# Patient Record
Sex: MALE | Race: WHITE | Employment: FULL TIME | URBAN - METROPOLITAN AREA
[De-identification: names, ages, dates, MRNs, and addresses within clinical notes are randomized per-mention and may not be internally consistent; named-entity substitution may affect disease eponyms.]

---

## 2020-10-19 ENCOUNTER — APPOINTMENT (OUTPATIENT)
Dept: CT IMAGING | Age: 65
End: 2020-10-19

## 2020-10-19 ENCOUNTER — HOSPITAL ENCOUNTER (INPATIENT)
Age: 65
LOS: 1 days | Discharge: HOME | DRG: 065 | End: 2020-10-20
Attending: INTERNAL MEDICINE | Admitting: INTERNAL MEDICINE
Payer: MEDICARE

## 2020-10-19 ENCOUNTER — APPOINTMENT (OUTPATIENT)
Dept: CT IMAGING | Age: 65
DRG: 065 | End: 2020-10-19
Attending: INTERNAL MEDICINE

## 2020-10-19 ENCOUNTER — APPOINTMENT (OUTPATIENT)
Dept: GENERAL RADIOLOGY | Age: 65
End: 2020-10-19

## 2020-10-19 ENCOUNTER — APPOINTMENT (OUTPATIENT)
Dept: MRI IMAGING | Age: 65
DRG: 065 | End: 2020-10-19
Attending: INTERNAL MEDICINE

## 2020-10-19 ENCOUNTER — HOSPITAL ENCOUNTER (EMERGENCY)
Age: 65
Discharge: ANOTHER HOSPITAL | End: 2020-10-19
Attending: STUDENT IN AN ORGANIZED HEALTH CARE EDUCATION/TRAINING PROGRAM
Payer: MEDICARE

## 2020-10-19 VITALS
HEART RATE: 66 BPM | TEMPERATURE: 97.8 F | BODY MASS INDEX: 24.27 KG/M2 | DIASTOLIC BLOOD PRESSURE: 98 MMHG | WEIGHT: 189.1 LBS | OXYGEN SATURATION: 96 % | SYSTOLIC BLOOD PRESSURE: 126 MMHG | HEIGHT: 74 IN | RESPIRATION RATE: 15 BRPM

## 2020-10-19 PROBLEM — C34.90 LUNG CANCER (HCC): Status: ACTIVE | Noted: 2020-10-19

## 2020-10-19 PROBLEM — R91.8 LUNG MASS: Status: ACTIVE | Noted: 2020-10-19

## 2020-10-19 PROBLEM — G45.9 TIA (TRANSIENT ISCHEMIC ATTACK): Status: ACTIVE | Noted: 2020-10-19

## 2020-10-19 LAB
A/G RATIO: 1.6 (ref 1.1–2.2)
ALBUMIN SERPL-MCNC: 4.6 G/DL (ref 3.4–5)
ALP BLD-CCNC: 60 U/L (ref 40–129)
ALT SERPL-CCNC: 15 U/L (ref 10–40)
ANION GAP SERPL CALCULATED.3IONS-SCNC: 9 MMOL/L (ref 3–16)
AST SERPL-CCNC: 23 U/L (ref 15–37)
BASOPHILS ABSOLUTE: 0.1 K/UL (ref 0–0.2)
BASOPHILS RELATIVE PERCENT: 0.9 %
BILIRUB SERPL-MCNC: 0.4 MG/DL (ref 0–1)
BUN BLDV-MCNC: 8 MG/DL (ref 7–20)
CALCIUM SERPL-MCNC: 9.8 MG/DL (ref 8.3–10.6)
CHLORIDE BLD-SCNC: 88 MMOL/L (ref 99–110)
CO2: 30 MMOL/L (ref 21–32)
CREAT SERPL-MCNC: 1.1 MG/DL (ref 0.8–1.3)
EKG ATRIAL RATE: 81 BPM
EKG DIAGNOSIS: NORMAL
EKG P AXIS: 75 DEGREES
EKG P-R INTERVAL: 166 MS
EKG Q-T INTERVAL: 396 MS
EKG QRS DURATION: 94 MS
EKG QTC CALCULATION (BAZETT): 460 MS
EKG R AXIS: -18 DEGREES
EKG T AXIS: 71 DEGREES
EKG VENTRICULAR RATE: 81 BPM
EOSINOPHILS ABSOLUTE: 0.1 K/UL (ref 0–0.6)
EOSINOPHILS RELATIVE PERCENT: 1 %
GFR AFRICAN AMERICAN: >60
GFR NON-AFRICAN AMERICAN: >60
GLOBULIN: 2.8 G/DL
GLUCOSE BLD-MCNC: 125 MG/DL (ref 70–99)
GLUCOSE BLD-MCNC: 153 MG/DL (ref 70–99)
HCT VFR BLD CALC: 44.7 % (ref 40.5–52.5)
HEMOGLOBIN: 15.2 G/DL (ref 13.5–17.5)
INR BLD: 1.01 (ref 0.86–1.14)
LYMPHOCYTES ABSOLUTE: 0.6 K/UL (ref 1–5.1)
LYMPHOCYTES RELATIVE PERCENT: 7.3 %
MCH RBC QN AUTO: 31.2 PG (ref 26–34)
MCHC RBC AUTO-ENTMCNC: 34.1 G/DL (ref 31–36)
MCV RBC AUTO: 91.6 FL (ref 80–100)
MONOCYTES ABSOLUTE: 0.7 K/UL (ref 0–1.3)
MONOCYTES RELATIVE PERCENT: 8 %
NEUTROPHILS ABSOLUTE: 6.9 K/UL (ref 1.7–7.7)
NEUTROPHILS RELATIVE PERCENT: 82.8 %
PDW BLD-RTO: 14.4 % (ref 12.4–15.4)
PERFORMED ON: ABNORMAL
PLATELET # BLD: 294 K/UL (ref 135–450)
PMV BLD AUTO: 6.2 FL (ref 5–10.5)
POTASSIUM REFLEX MAGNESIUM: 4 MMOL/L (ref 3.5–5.1)
PRO-BNP: 272 PG/ML (ref 0–124)
PROTHROMBIN TIME: 11.7 SEC (ref 10–13.2)
RBC # BLD: 4.88 M/UL (ref 4.2–5.9)
SODIUM BLD-SCNC: 127 MMOL/L (ref 136–145)
TOTAL PROTEIN: 7.4 G/DL (ref 6.4–8.2)
TROPONIN: <0.01 NG/ML
WBC # BLD: 8.3 K/UL (ref 4–11)

## 2020-10-19 PROCEDURE — 93010 ELECTROCARDIOGRAM REPORT: CPT | Performed by: INTERNAL MEDICINE

## 2020-10-19 PROCEDURE — 2060000000 HC ICU INTERMEDIATE R&B

## 2020-10-19 PROCEDURE — 80053 COMPREHEN METABOLIC PANEL: CPT

## 2020-10-19 PROCEDURE — 70498 CT ANGIOGRAPHY NECK: CPT

## 2020-10-19 PROCEDURE — 70553 MRI BRAIN STEM W/O & W/DYE: CPT

## 2020-10-19 PROCEDURE — 2580000003 HC RX 258: Performed by: INTERNAL MEDICINE

## 2020-10-19 PROCEDURE — 6370000000 HC RX 637 (ALT 250 FOR IP): Performed by: STUDENT IN AN ORGANIZED HEALTH CARE EDUCATION/TRAINING PROGRAM

## 2020-10-19 PROCEDURE — 6360000004 HC RX CONTRAST MEDICATION: Performed by: STUDENT IN AN ORGANIZED HEALTH CARE EDUCATION/TRAINING PROGRAM

## 2020-10-19 PROCEDURE — A9579 GAD-BASE MR CONTRAST NOS,1ML: HCPCS | Performed by: STUDENT IN AN ORGANIZED HEALTH CARE EDUCATION/TRAINING PROGRAM

## 2020-10-19 PROCEDURE — 97161 PT EVAL LOW COMPLEX 20 MIN: CPT

## 2020-10-19 PROCEDURE — 84484 ASSAY OF TROPONIN QUANT: CPT

## 2020-10-19 PROCEDURE — 93005 ELECTROCARDIOGRAM TRACING: CPT | Performed by: STUDENT IN AN ORGANIZED HEALTH CARE EDUCATION/TRAINING PROGRAM

## 2020-10-19 PROCEDURE — 85610 PROTHROMBIN TIME: CPT

## 2020-10-19 PROCEDURE — 99285 EMERGENCY DEPT VISIT HI MDM: CPT

## 2020-10-19 PROCEDURE — 70450 CT HEAD/BRAIN W/O DYE: CPT

## 2020-10-19 PROCEDURE — 83880 ASSAY OF NATRIURETIC PEPTIDE: CPT

## 2020-10-19 PROCEDURE — 92610 EVALUATE SWALLOWING FUNCTION: CPT

## 2020-10-19 PROCEDURE — 97530 THERAPEUTIC ACTIVITIES: CPT

## 2020-10-19 PROCEDURE — 71045 X-RAY EXAM CHEST 1 VIEW: CPT

## 2020-10-19 PROCEDURE — 99223 1ST HOSP IP/OBS HIGH 75: CPT | Performed by: PSYCHIATRY & NEUROLOGY

## 2020-10-19 PROCEDURE — 6370000000 HC RX 637 (ALT 250 FOR IP): Performed by: INTERNAL MEDICINE

## 2020-10-19 PROCEDURE — 2580000003 HC RX 258: Performed by: STUDENT IN AN ORGANIZED HEALTH CARE EDUCATION/TRAINING PROGRAM

## 2020-10-19 PROCEDURE — 71250 CT THORAX DX C-: CPT

## 2020-10-19 PROCEDURE — 85025 COMPLETE CBC W/AUTO DIFF WBC: CPT

## 2020-10-19 RX ORDER — 0.9 % SODIUM CHLORIDE 0.9 %
1000 INTRAVENOUS SOLUTION INTRAVENOUS ONCE
Status: COMPLETED | OUTPATIENT
Start: 2020-10-19 | End: 2020-10-19

## 2020-10-19 RX ORDER — ASPIRIN 81 MG/1
81 TABLET ORAL DAILY
Status: DISCONTINUED | OUTPATIENT
Start: 2020-10-20 | End: 2020-10-20 | Stop reason: HOSPADM

## 2020-10-19 RX ORDER — POLYETHYLENE GLYCOL 3350 17 G/17G
17 POWDER, FOR SOLUTION ORAL DAILY PRN
Status: DISCONTINUED | OUTPATIENT
Start: 2020-10-19 | End: 2020-10-20 | Stop reason: HOSPADM

## 2020-10-19 RX ORDER — ASPIRIN 81 MG/1
324 TABLET, CHEWABLE ORAL ONCE
Status: COMPLETED | OUTPATIENT
Start: 2020-10-19 | End: 2020-10-19

## 2020-10-19 RX ORDER — SODIUM CHLORIDE 0.9 % (FLUSH) 0.9 %
10 SYRINGE (ML) INJECTION PRN
Status: DISCONTINUED | OUTPATIENT
Start: 2020-10-19 | End: 2020-10-20 | Stop reason: HOSPADM

## 2020-10-19 RX ORDER — PROMETHAZINE HYDROCHLORIDE 25 MG/1
12.5 TABLET ORAL EVERY 6 HOURS PRN
Status: DISCONTINUED | OUTPATIENT
Start: 2020-10-19 | End: 2020-10-20 | Stop reason: HOSPADM

## 2020-10-19 RX ORDER — ASPIRIN 300 MG/1
300 SUPPOSITORY RECTAL DAILY
Status: DISCONTINUED | OUTPATIENT
Start: 2020-10-20 | End: 2020-10-20 | Stop reason: HOSPADM

## 2020-10-19 RX ORDER — SODIUM CHLORIDE 0.9 % (FLUSH) 0.9 %
10 SYRINGE (ML) INJECTION EVERY 12 HOURS SCHEDULED
Status: DISCONTINUED | OUTPATIENT
Start: 2020-10-19 | End: 2020-10-20 | Stop reason: HOSPADM

## 2020-10-19 RX ORDER — ONDANSETRON 2 MG/ML
4 INJECTION INTRAMUSCULAR; INTRAVENOUS EVERY 6 HOURS PRN
Status: DISCONTINUED | OUTPATIENT
Start: 2020-10-19 | End: 2020-10-20 | Stop reason: HOSPADM

## 2020-10-19 RX ORDER — ATORVASTATIN CALCIUM 80 MG/1
80 TABLET, FILM COATED ORAL NIGHTLY
Status: DISCONTINUED | OUTPATIENT
Start: 2020-10-19 | End: 2020-10-20 | Stop reason: HOSPADM

## 2020-10-19 RX ADMIN — ASPIRIN 324 MG: 81 TABLET, CHEWABLE ORAL at 03:04

## 2020-10-19 RX ADMIN — IOPAMIDOL 85 ML: 755 INJECTION, SOLUTION INTRAVENOUS at 01:54

## 2020-10-19 RX ADMIN — Medication 10 ML: at 20:19

## 2020-10-19 RX ADMIN — Medication 10 ML: at 12:26

## 2020-10-19 RX ADMIN — SODIUM CHLORIDE 1000 ML: 9 INJECTION, SOLUTION INTRAVENOUS at 02:03

## 2020-10-19 RX ADMIN — GADOTERIDOL 18 ML: 279.3 INJECTION, SOLUTION INTRAVENOUS at 16:40

## 2020-10-19 RX ADMIN — ATORVASTATIN CALCIUM 80 MG: 80 TABLET, FILM COATED ORAL at 20:19

## 2020-10-19 ASSESSMENT — ENCOUNTER SYMPTOMS
SHORTNESS OF BREATH: 0
PHOTOPHOBIA: 0
RHINORRHEA: 0
NAUSEA: 0
VOMITING: 0
SORE THROAT: 0
COUGH: 1
BACK PAIN: 0
ABDOMINAL PAIN: 0

## 2020-10-19 ASSESSMENT — PAIN SCALES - GENERAL
PAINLEVEL_OUTOF10: 0

## 2020-10-19 NOTE — H&P
Hospital Medicine History & Physical      PCP: No primary care provider on file. Date of Admission: 10/19/2020    Date of Service: Pt seen/examined on 10/19/2020  and Admitted to Inpatient with expected LOS greater than two midnights due to medical therapy. Chief Complaint: Right-sided weakness    History Of Present Illness:   77 y.o. male who presented to [facility] with right-sided weakness. PMHx significant for remote history of lung cancer status post surgery and chemotherapy. Who presents with an episode of acute onset of right-sided weakness greater in the upper extremity than lower extremity. Symptoms occurred late at night after he had eaten dinner but before he was asleep. He denies any speech difficulty, facial droop or balance issues. 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 During ED evaluation his symptoms are gradually improving. The stroke team was consulted. Head CT was negative. CTA head and neck was negative for any large vessel occlusion. Ultimately he was deemed not a TPA candidate due to mild and resolving symptoms. He was admitted for further evaluation. At the time my evaluation following morning patient reports his right side is practically back to normal.  He denies any speech disturbance, facial droop, weakness, sensory change. He is ambulating without any significant difficulty. The head and neck CTA incidentally noted a left sided lung mass in the upper lobe. Based on his reported history of right-sided lung cancer with surgical resection with suspect this may be a new lesion. The most recent CT scan available in the electronic record is from 2011 through [facility] which showed right sided scarring but no significant disease on the left. He previously followed with Dr. Angella Hannon through [facility] oncology.       Past Medical History:          Diagnosis Date    Cancer [facility])     lung ca 2001       Past Surgical History:          Procedure Laterality Date    APPENDECTOMY      LUNG CANCER SURGERY         Medications Prior to Admission:      Prior to Admission medications    Medication Sig Start Date End Date Taking? Authorizing Provider   EPINEPHrine (EPIPEN 2-KAMI) 0.3 MG/0.3ML SOAJ injection Inject 0.3 mLs into the muscle once as needed (severe allergic reaction) Inject into lateral thigh muscle. 12/20/17 12/20/17  Megha Contreras MD       Allergies:  Patient has no known allergies. Social History:    TOBACCO:   reports that he has been smoking cigarettes. He has been smoking about 2.00 packs per day. He has never used smokeless tobacco.  ETOH:   reports current alcohol use. E-Cigarettes Vaping or Juuling     Questions Responses    Vaping Use     Start Date     Does device contain nicotine? Quit Date     Vaping Type             Family History:    Reviewed and negative in regards to presenting illness. REVIEW OF SYSTEMS:   Pertinent positives as noted in the HPI. All other systems reviewed and negative. Physical Exam Performed:    BP (!) 145/96   Pulse 69   Temp 98 °F (36.7 °C) (Oral)   Resp 16   SpO2 96%     General appearance: No apparent distress, appears stated age and cooperative. HEENT:  Normal cephalic, atraumatic without obvious deformity. Pupils equal, round, and reactive to light. Extra ocular muscles intact. Conjunctivae/corneas clear. Neck: Supple, no jugular venous distention. Trachea midline with full range of motion. Respiratory:  Normal respiratory effort. Clear to auscultation, bilaterally without Rales/Wheezes/Rhonchi. Cardiovascular: Regular rate and rhythm with normal S1/S2 without murmurs, rubs or gallops. Abdomen: Soft, non-tender, non-distended with normal bowel sounds. Musculoskelatal: No clubbing, cyanosis or edema bilaterally. Full range of motion without deformity. Neurologic:  Neurovascularly intact without any focal sensory/motor deficits.  Cranial nerves: II-XII intact, grossly non-focal.  Psychiatric: Alert and oriented, thought content appropriate, normal insight  Skin: Surgical scar right posterior chest wall. Skin color, texture, turgor normal.  No rashes or lesions. Capillary Refill: Brisk,< 3 seconds   Peripheral Pulses: +2 palpable, equal bilaterally       Labs:     Recent Labs     10/19/20  0133   WBC 8.3   HGB 15.2   HCT 44.7        Recent Labs     10/19/20  0133   *   K 4.0   CL 88*   CO2 30   BUN 8   CREATININE 1.1   CALCIUM 9.8     Recent Labs     10/19/20  0133   AST 23   ALT 15   BILITOT 0.4   ALKPHOS 60     Recent Labs     10/19/20  0133   INR 1.01     Recent Labs     10/19/20  0133   TROPONINI <0.01       Radiology:     CXR: I have reviewed the CXR with the following interpretation: Clear. EKG:  I have reviewed the EKG with the following interpretation: Normal sinus rhythm, no acute ischemic changes. Ct Head Wo Contrast    Result Date: 10/19/2020  EXAMINATION: CT OF THE HEAD WITHOUT CONTRAST  10/19/2020 1:32 am TECHNIQUE: CT of the head was performed without the administration of intravenous contrast. Dose modulation, iterative reconstruction, and/or weight based adjustment of the mA/kV was utilized to reduce the radiation dose to as low as reasonably achievable. COMPARISON: None. HISTORY: ORDERING SYSTEM PROVIDED HISTORY: Right sided weakness TECHNOLOGIST PROVIDED HISTORY: Reason for exam:->Right sided weakness Has a \"code stroke\" or \"stroke alert\" been called? ->Yes Reason for Exam: stroke protocol Acuity: Acute Type of Exam: Initial Additional signs and symptoms: stroke FINDINGS: BRAIN/VENTRICLES: There is no acute intracranial hemorrhage, mass effect or midline shift. No abnormal extra-axial fluid collection. The gray-white differentiation is maintained without evidence of an acute infarct. There is prominence of the ventricles and sulci due to global parenchymal volume loss.  There are nonspecific areas of hypoattenuation within the periventricular and subcortical white matter, which likely represent chronic microvascular ischemic change. Atherosclerosis. ORBITS: The visualized portion of the orbits demonstrate no acute abnormality. SINUSES: Minimal right maxillary sinus mucosal thickening. Fluid in the left greater than right mastoid air cells. SOFT TISSUES/SKULL: No acute abnormality of the visualized skull or soft tissues. No acute intracranial abnormality. Chronic small vessel ischemic disease. Critical results were called by Dr. Bert Martinez to [facility] [place] on 10/19/2020 at 01:56. [facility] Chest Portable    Result Date: 10/19/2020  EXAMINATION: ONE XRAY VIEW OF THE CHEST 10/19/2020 1:53 am COMPARISON: None. HISTORY: ORDERING SYSTEM PROVIDED HISTORY: stroke like symptoms TECHNOLOGIST PROVIDED HISTORY: Reason for exam:->stroke like symptoms Reason for Exam: stroke protocol Acuity: Acute Type of Exam: Unknown Additional signs and symptoms: rt side weakness FINDINGS: No pneumothorax, pleural effusion or consolidative airspace disease. There seems to be some linear scarring at the lung apices, not well delineated. Same dense small nodular opacities also present. Surgical clips overlie the low right neck. Normal heart size and mediastinal contours. No acute osseous abnormality. No acute cardiopulmonary findings. Probably some scarring at the lung apices, along with some punctate calcified granulomas. Cta Head Neck W Contrast    Result Date: 10/19/2020  EXAMINATION: CTA OF THE HEAD AND NECK WITH CONTRAST 10/19/2020 1:33 am: TECHNIQUE: CTA of the head and neck was performed with the administration of intravenous contrast. Multiplanar reformatted images are provided for review. MIP images are provided for review. Stenosis of the internal carotid arteries measured using NASCET criteria.  Dose modulation, iterative reconstruction, and/or weight based adjustment of the mA/kV was utilized to reduce the radiation dose to as low as reasonably achievable. COMPARISON: None. HISTORY: ORDERING SYSTEM PROVIDED HISTORY: right sided deficits TECHNOLOGIST PROVIDED HISTORY: Reason for exam:->right sided deficits Reason for Exam: stroke protocol Acuity: Acute Type of Exam: Unknown Additional signs and symptoms: rt side weakness FINDINGS: CTA NECK: AORTIC ARCH/ARCH VESSELS: Atherosclerosis in the visualized thoracic aorta. No evidence of aneurysm or dissection in the visualized thoracic aorta. Atherosclerosis of the right brachiocephalic and left subclavian arteries without hemodynamically significant stenosis. Focal occlusion/high-grade stenosis in the proximal right subclavian artery with reconstitution of flow just proximal to the origin of the right vertebral artery. CAROTID ARTERIES: Atherosclerosis of the bilateral common carotid arteries without hemodynamically significant stenosis, acute injury or dissection. Bilateral carotid bulb atherosclerotic plaque. No stenosis of the bilateral cervical internal carotid arteries per NASCET criteria. VERTEBRAL ARTERIES: No dissection, arterial injury, or significant stenosis. SOFT TISSUES: The lung apices demonstrate respiratory motion, linear scarring, emphysema and bullous disease. Left upper lobe heterogeneous 4.5 x 2.0 cm masslike opacity with some cavitation may relate to patient history of lung cancer. No cervical or superior mediastinal lymphadenopathy. The larynx and pharynx are unremarkable. No acute abnormality of the salivary and thyroid glands. BONES: No acute osseous abnormality. Multilevel degenerative changes in the visualized spine. CTA HEAD: ANTERIOR CIRCULATION: No significant stenosis of the intracranial internal carotid, anterior cerebral, or middle cerebral arteries. No aneurysm. Atherosclerosis of the bilateral intracranial internal carotid arteries without hemodynamically significant stenosis.  POSTERIOR CIRCULATION: No significant stenosis of the vertebral, basilar, or setting. However this would not explain decreased flow to the left hemisphere. Will check echocardiogram.  Monitor on telemetry. Monitor blood pressure. Check hemoglobin A1c and FLP. Continue aspirin and statin. Lung mass: Remote history of right-sided lung cancer status post surgical resection and chemotherapy. Previously followed by Dr. Gretchen Gaucher through [facility]. The CTA head and neck has incidentally demonstrated a left upper lobe cavitary mass. Given his smoking history cannot exclude new or recurrent malignancy. Recommend formal chest CT. Consider oncology evaluation depending on these results. Hyponatremia: Mild at 127. Most pressing issue would be to rule out active lung cancer. Monitor.       DVT Prophylaxis: Lovenox  Diet: Diet NPO Effective Now  Code Status: Full Code    PT/OT Eval Status: NA    Dispo -2 days       Ingrid Beebe MD

## 2020-10-19 NOTE — ED NOTES
9603- page to [facility] for transfer consult per Dr. Dionne Albarado. David Falcon  10/19/20 [phone]- Dr. Jesus Reid returned page spoke with Dr. Dionne Albarado. [facility] [employer]  10/19/20 0300    0300- Dr. Jesus Reid accepted pt to [facility]. Waiting on bed assignment. David Falcon  10/19/20 7515    [phone]- per [facility].   Pt is going to [facility]  Bed# 439-1  Report# [phone]  Accepting: Dr. Jesus Reid  Transport: [facility]  ETA: 0900     David Falcon  10/19/20 0273

## 2020-10-19 NOTE — ED NOTES
0129- code stroke per Dr. Anne Kins- call  stroke team at [phone] for code stroke. Nedra Breath  10/19/20 0132    0130- face to face told CT tech Tamica Valiente of code stroke. Nedra Breath  10/19/20 0132    18Alfredo Ground with  stroke team returned call spoke with Dr. Michael Hanna. Lesgamaliel Breath  10/19/20 [address] radiology returned page spoke with Dr. Michael Hanna. Heena Gonzalez with  stroke team spoke with Dr. Michael Hanna.      Nedra Breath  10/19/20 0200

## 2020-10-19 NOTE — PROGRESS NOTES
Amelia Bentleys for pt to come off tele for MRI per Dr. Etelvina Beverly. MRI check list completed by pt and being faxed to MRI.   [id number] [place] Marissa  10/19/2020

## 2020-10-19 NOTE — ED NOTES
Initially per Dr Kylah Mendez pt has some minimal right sided weakness, but has resolved- when returned from CT upon repeat exam right sided weakness has resolved.      Emerson Henderson, RN  10/19/20 [address] Alex Estrada, RN  10/19/20 9692

## 2020-10-19 NOTE — ED PROVIDER NOTES
[address] ED  EMERGENCY DEPARTMENT ENCOUNTER      Pt Name: Ty Cannon  MRN: [id number]  [place] 1955  Date of evaluation: 10/19/2020  Provider: Yakelin Dickey, [address] [place] [address]       Chief Complaint   Patient presents with    Extremity Weakness     pt was eating dinner about 1.5 hours ago and after dinner pt then had sudden weakness to right side extremities. pt is alert and able to answer questions at this time, FSBS 181 for squad. HISTORY OF PRESENT ILLNESS   (Location/Symptom, Timing/Onset, Context/Setting, Quality, Duration, Modifying Factors, Severity)  Note limiting factors. Ty Cannon is a 77 y.o. male w history of lung cancer ho presents to the emergency department complaining of right-sided weakness. Last known well midnight, states that he was finishing his dinner when he noticed right upper and lower extremity weakness upper extremity greater than lower. Denies sensory change. Denied headache, vision change neck pain or stiffness. Patient denies history of CVA. Does report prior history of anisocoria as well as right-sided face asymmetry secondary to complications with lung cancer in 2001. Denies hypertension, hyperlipidemia, diabetes. Is a smoker. Patient's blood sugar was 180s for EMS. On arrival patient awake answering questions appropriately, is complaining of mild right upper and right lower extremity weakness, had difficulty ambulating for EMS secondary to weakness. Symptoms are improving at time of arrival.        Nursing Notes were reviewed.     PAST MEDICAL HISTORY     Past Medical History:   Diagnosis Date    Cancer [facility])     lung ca 2001         SURGICAL HISTORY       Past Surgical History:   Procedure Laterality Date    APPENDECTOMY      LUNG CANCER SURGERY           CURRENT MEDICATIONS       Previous Medications    EPINEPHRINE (EPIPEN 2-KAMI) 0.3 MG/0.3ML SOAJ INJECTION    Inject 0.3 mLs into the muscle once as needed (severe allergic reaction) Inject into lateral thigh muscle. ALLERGIES     Patient has no known allergies. FAMILY HISTORY     History reviewed. No pertinent family history. SOCIAL HISTORY       Social History     Socioeconomic History    Marital status:      Spouse name: None    Number of children: None    Years of education: None    Highest education level: None   Occupational History    None   Social Needs    Financial resource strain: None    Food insecurity     Worry: None     Inability: None    Transportation needs     Medical: None     Non-medical: None   Tobacco Use    Smoking status: Current Every Day Smoker     Packs/day: 2.00     Types: Cigarettes    Smokeless tobacco: Never Used   Substance and Sexual Activity    Alcohol use: Yes     Comment: occasionally    Drug use: No    Sexual activity: None   Lifestyle    Physical activity     Days per week: None     Minutes per session: None    Stress: None   Relationships    Social connections     Talks on phone: None     Gets together: None     Attends Buddhism service: None     Active member of club or organization: None     Attends meetings of clubs or organizations: None     Relationship status: None    Intimate partner violence     Fear of current or ex partner: None     Emotionally abused: None     Physically abused: None     Forced sexual activity: None   Other Topics Concern    None   Social History Narrative    None       SCREENINGS   NIH Stroke Scale  Interval: Reassessment  Level of Consciousness (1a. ): Alert  LOC Questions (1b. ):  Answers both correctly  LOC Commands (1c. ): Performs both tasks correctly  Best Gaze (2. ): Normal  Visual (3. ): No visual loss  Facial Palsy (4. ): (!) Minor paralysis(pt has slight right facial and right eye weakness/droop from a previous health problem)  Motor Arm, Left (5a. ): No drift  Motor Arm, Right (5b. ): No drift  Motor Leg, Left (6a. ): No drift  Motor Leg, Right (6b. ): No drift  Limb Ataxia (7. ): Absent  Sensory (8. ): Normal  Best Language (9. ): No aphasia  Dysarthria (10. ): Normal  Extinction and Inattention (11): No abnormality  Total: 1    [place] Coma Scale  Eye Opening: Spontaneous  Best Verbal Response: Oriented  Best Motor Response: Obeys commands  W[place] Coma Scale Score: 15                   REVIEW OF SYSTEMS    (2-9 systems for level 4, 10 or more for level 5)   Review of Systems   Constitutional: Negative for chills and fever. HENT: Negative for congestion, rhinorrhea and sore throat. Eyes: Negative for photophobia and visual disturbance. Respiratory: Positive for cough. Negative for shortness of breath. Cardiovascular: Negative for chest pain and palpitations. Gastrointestinal: Negative for abdominal pain, nausea and vomiting. Genitourinary: Negative for decreased urine volume. Musculoskeletal: Negative for back pain, neck pain and neck stiffness. Skin: Negative for rash. Neurological: Positive for weakness. Negative for numbness and headaches. Psychiatric/Behavioral: Negative for confusion. PHYSICAL EXAM    (up to 7 for level 4, 8 or more for level 5)     ED Triage Vitals   BP Temp Temp src Pulse Resp SpO2 Height Weight   -- -- -- -- -- -- -- --       Physical Exam  Constitutional:       General: He is not in acute distress. Appearance: He is not diaphoretic. HENT:      Head: Normocephalic and atraumatic. Eyes:      Pupils: Pupils are equal, round, and reactive to light. Neck:      Musculoskeletal: Normal range of motion and neck supple. Trachea: No tracheal deviation. Cardiovascular:      Rate and Rhythm: Normal rate and regular rhythm. Pulmonary:      Effort: Pulmonary effort is normal. No respiratory distress. Abdominal:      General: There is no distension. Palpations: Abdomen is soft. Tenderness: There is no abdominal tenderness. Musculoskeletal: Normal range of motion. General: No deformity.    Skin: General: Skin is warm and dry. Neurological:      Mental Status: He is alert and oriented to person, place, and time. Sensory: No sensory deficit. Motor: Weakness present. Comments: See NIH note. Normal heel shin, finger-nose testing          INITIAL NIH STROKE SCALE    Time Performed:  125 AM     1a. Level of consciousness:  0 - alert; keenly responsive  1b. Level of consciousness questions:  0 - answers both questions correctly  1c. Level of consciousness questions:  0 - performs both tasks correctly  2. Best Gaze:  0 - normal  3. Visual:  0 - no visual loss  4. Facial Palsy:  1 - minor paralysis (flattened nasolabial fold, asymmetric on smiling)  5a. Motor left arm:  0 - no drift, limb holds 90 (or 45) degrees for full 10 seconds  5b. Motor right arm:  1 - drift, limb holds 90 (or 45) degrees but drifts down before full 10 seconds: does not hit bed  6a. Motor left le - no drift; leg holds 30 degree position for full 5 seconds  6b. Motor right le - drift; leg falls by the end of the 5 second period but does not hit bed  7. Limb Ataxia:  0 - absent  8. Sensory:  0 - normal; no sensory loss  9. Best Language:  0 - no aphasia, normal  10. Dysarthria:  0 - normal  11. Extinction and Inattention:  0 - no abnormality    TOTAL:  3          DIAGNOSTIC RESULTS     EKG: All EKG's are interpreted by the Emergency Department Physician who either signs or Co-signs this chart in the absence of a cardiologist.      The Ekg interpreted by me shows  normal sinus rhythm with a rate of 81  Axis is   Normal  QTc is  normal  Intervals and Durations are unremarkable. ST Segments: nonspecific changes    No significant change from prior EKG dated none        RADIOLOGY:   Non-plain film images such as CT, Ultrasound and MRI are read by the radiologist. Plain radiographic images are visualized and preliminarily interpreted by the emergency physician.     Interpretation per the Radiologist below, if available at the time of this note:    XR CHEST PORTABLE   Final Result   No acute cardiopulmonary findings. Probably some scarring at the lung   apices, along with some punctate calcified granulomas. CTA HEAD NECK W CONTRAST   Final Result   1. No large vessel occlusion. No intracranial proximal large artery   hemodynamically significant stenosis, occlusion or aneurysm. 2.  No stenosis of the bilateral cervical internal carotid arteries per   NASCET criteria. Bilateral carotid bulb atherosclerotic plaque. 3.  Focal high-grade stenosis/occlusion of the proximal right subclavian   artery with reconstitution of flow just proximal to the right vertebral   artery origin. Subclavian steal syndrome should be considered in the   appropriate clinical setting. 4.  Heterogeneous left upper lobe 4.5 cm masslike opacity with some   cavitation may relate to patient history of lung cancer. Correlation with   chest CT may be helpful. Critical results were called by Dr. Bert Martinez to Wendy Fontana on   10/19/2020 at 02:38. CT HEAD WO CONTRAST   Final Result   No acute intracranial abnormality. Chronic small vessel ischemic disease. Critical results were called by Dr. Bert Martinez to Wendy Fontana on   10/19/2020 at 01:56. [employer]                LABS:  Labs Reviewed   CBC WITH AUTO DIFFERENTIAL - Abnormal; Notable for the following components:       Result Value    Lymphocytes Absolute 0.6 (*)     All other components within normal limits    Narrative:     Performed at:  [facility] [address],  [place], [facility]   Phone [phone]   COMPREHENSIVE METABOLIC PANEL W/ REFLEX TO MG FOR LOW K - Abnormal; Notable for the following components:    Sodium 127 (*)     Chloride 88 (*)     Glucose 125 (*)     All other components within normal limits    Narrative:     Performed at:  [facility]  [facility] [address],  [place], [facility]   Phone [phone]   BRAIN NATRIURETIC PEPTIDE - Abnormal; Notable for the following components:    Pro- (*)     All other components within normal limits    Narrative:     Performed at:  [facility] [address],  [place], [facility]   Phone [phone]   POCT GLUCOSE - Abnormal; Notable for the following components:    POC Glucose 153 (*)     All other components within normal limits    Narrative:     Performed at:  [facility] [address],  [place], [facility]   Phone [phone]   TROPONIN    Narrative:     Performed at:  [place] [address],  [place], [facility]   Phone [phone]   PROTIME-INR    Narrative:     Performed at:  [facility]) - [facility] [address],  [place], [place] [employer]   Phone [phone]   POCT GLUCOSE       All other labs were within normal range or not returned as of this dictation. EMERGENCY DEPARTMENT COURSE and DIFFERENTIAL DIAGNOSIS/MDM:   Obed Spencer is a 77 y.o. male who presents to the emergency department with the complaint of right-sided weakness upper lower extremity, last known well midnight, no prior history of CVA, smoker, does not follow with a physician unknown significant past medical history. Vitals stable on arrival.  Minimal right upper and lower extremity weakness, right-sided facial droop, facial droop? Chronic. History of lung cancer early 2000. Symptoms resolving on arrival, stroke alert called, CT head CTA head neck, stroke work-up.     Case discussed with stroke team, patient is a TPA candidate, patient currently in CT will confirm inclusion exclusion criteria, discussed with son    Patient was reevaluated following return from [place] , patient believes that his weakness is completely resolved and clinically appears symmetric right and left.  Due to resolving symptoms, nondisabling symptoms at baseline patient was not given TPA    MIPS 187    1. The patient arrived within two (2) hours of their last known well time: Yes  2. IV-tPA therapy was initiated within three (3) hours of their last known well time: No    IV-tPA was not provided to the patient due to:   1. Medical contra-indication was present: No  2. Patient or family declined IV-tPA: No  3. The patient was brought to interventional lab for treatment: No  4. Other clinical reason for not providing IV-tPA: No LVO, resolving nondisabling's NIH score/symptoms    Patient symptoms fully resolved, TIA. Discussed with [facility] for transfer for further stroke work-up. CRITICAL CARE TIME   Total Critical Care time was at least 30 minutes, excluding separately reportable procedures. There was a high probability of clinically significant/life threatening deterioration in the patient's condition which required my urgent intervention. Clinical concern CVA versus TIA  Intervention stroke evaluation, discussion with stroke team, discussion with admission services    CONSULTS:  IP CONSULT TO PHARMACY  PHARMACY TO CHANGE BASE FLUIDS    PROCEDURES:  Unless otherwise noted below, none     Procedures        FINAL IMPRESSION      1. TIA (transient ischemic attack)    2. Hyponatremia          DISPOSITION/PLAN   DISPOSITION  xfer Nadeem Cale      PATIENT REFERRED TO:  No follow-up provider specified. DISCHARGE MEDICATIONS:  New Prescriptions    No medications on file     Controlled Substances Monitoring:     No flowsheet data found.     (Please note that portions of this note were completed with a voice recognition program.  Efforts were made to edit the dictations but occasionally words are mis-transcribed.)    Argenis Hall DO (electronically signed)  Attending Emergency Physician            Argenis Hall DO  10/19/20 0306

## 2020-10-19 NOTE — PROGRESS NOTES
Patient admitted to room 439 from ER. Patient oriented to room, call light, bed rails, phone, lights and bathroom. Patient instructed about the schedule of the day including: vital sign frequency, lab draws, possible tests, frequency of MD and staff rounds, including RN/MD rounding together at bedside, daily weights, and I &O's. Patient instructed about prescribed diet, how to use 8MENU, and television. bed alarm in place, patient aware of placement and reason. Telemetry box 119 in place, patient aware of placement and reason. Suction set up in room. Bed locked, in lowest position, side rails up 2/4, call light within reach. Will continue to monitor.   [id number] [place]  10/19/2020

## 2020-10-19 NOTE — PROGRESS NOTES
Speech Language Pathology  Facility/Department: [facility] C4 PCU   CLINICAL BEDSIDE SWALLOW EVALUATION      Recommended Diet and Intervention  Diet Solids Recommendation: Regular  Liquid Consistency Recommendation: Thin  Recommended Form of Meds: PO      NAME: Edwina Mederos  : 1955  MRN: [id number]    ADMISSION DATE: 10/19/2020  ADMITTING DIAGNOSIS: has TIA (transient ischemic attack) and Lung mass on their problem list.  ONSET DATE: Pt admitted to [facility] on 10/19/20    Recent Chest Xray (10/19/20): Impression    No acute cardiopulmonary findings.  Probably some scarring at the lung    apices, along with some punctate calcified granulomas. Date of Eval: 10/19/2020  Evaluating Therapist: Geo Reed    Current Diet level:  Current Diet : NPO(Pending BSE)  Current Liquid Diet : NPO(Pending BSE)      Primary Complaint  Patient Complaint: Per MD H&P, Catrina Morrow is a 77 y.o. male w history of lung cancer ho presents to the emergency department complaining of right-sided weakness. Last known well midnight, states that he was finishing his dinner when he noticed right upper and lower extremity weakness upper extremity greater than lower. Denies sensory change. Denied headache, vision change neck pain or stiffness. Patient denies history of CVA. Does report prior history of anisocoria as well as right-sided face asymmetry secondary to complications with lung cancer in . Denies hypertension, hyperlipidemia, diabetes. Is a smoker. Patient's blood sugar was 180s for EMS. On arrival patient awake answering questions appropriately, is complaining of mild right upper and right lower extremity weakness, had difficulty ambulating for EMS secondary to weakness. Symptoms are improving at time of arrival\".     Pain:  Pain Assessment  Pain Assessment: 0-10  Pain Level: 0    Reason for Referral  Edwina Mederos was referred for a bedside swallow evaluation to assess the efficiency of his swallow function, identify signs and symptoms of aspiration and make recommendations regarding safe dietary consistencies, effective compensatory strategies, and safe eating environment. Impression  Dysphagia Diagnosis: Swallow function appears grossly intact  Dysphagia Outcome Severity Scale: Level 6: Within functional limits/Modified independence   Pt seen upright in bed, alert and agreeable to evaluation, RN OK'd SLP entry and evaluation. Pt reported symptoms have largely resolved. Pt with baseline R-sided facial asymmetry per chart, pt s/p lung cancer tx. Pt denied speech/lang/cog changes, able to participate in conversation with SLP without difficulty. Pt denied dysphagia, no hx of dysphagia per chart review. Pt observed with ice chips, thin liquid trials via cup and straw, mech soft, and regular solid trials during BSE. Swallow function grossly WFL. See oral and pharyngeal phase sections below for details. Regular solid diet with thin liquids / no straws recommended at this time. RN aware of recs. ST to continue to follow. Treatment Plan  Requires SLP Intervention: Yes(1-2 total F/Us only)  Duration/Frequency of Treatment: 1-2 total F/Us  D/C Recommendations: Home independently(Further ST likely not indicated)    Recommended Diet and Intervention  Diet Solids Recommendation: Regular  Liquid Consistency Recommendation: Thin  Recommended Form of Meds: PO  Recommendations: Dysphagia treatment  Therapeutic Interventions: Diet tolerance monitoring;Patient/Family education    Compensatory Swallowing Strategies  Compensatory Swallowing Strategies: Small bites/sips; Remain upright for 30-45 minutes after meals;Upright as possible for all oral intake; Alternate solids and liquids; No straws    Treatment/Goals  Short-term Goals  Timeframe for Short-term Goals: 1 day (10/20/20)  Long-term Goals  Timeframe for Long-term Goals: 2 days (10/21/20)  Goal 1: The pt will tolerate safest and least restrictive diet without s/s of aspiration. Dysphagia Goals: The patient will tolerate recommended diet without observed clinical signs of aspiration; The patient/caregiver will demonstrate understanding of compensatory strategies for improved swallowing safety. General  Chart Reviewed: Yes  Comments: Pt admitted d/t right-sided weakness, TIA. Pt has hx of lung cancer (2001 per chart). Behavior/Cognition: Alert; Cooperative;Pleasant mood  Respiratory Status: Room air  Communication Observation: Functional  Follows Directions: Simple  Dentition: Some missing teeth(Pt typically wears top dentures; not present currently)  Patient Positioning: Upright in bed  Baseline Vocal Quality: Normal  Volitional Cough: Strong  Prior Dysphagia History: No hx of dysphagia per chart review. Consistencies Administered: Dysphagia Minced and Moist (Dysphagia II); Reg solid; Thin - cup; Thin - straw; Ice Chips    Vision/Hearing  Vision  Vision: Within Functional Limits  Hearing  Hearing: Within functional limits    Oral Motor Deficits  Oral/Motor  Oral Motor: Exceptions to WFL(R-sided facial weakness at rest, with smile (baseline per pt, s/p tx with lung cancer))  Labial Symmetry: Abnormal symmetry right    Oral Phase Dysfunction  Oral phase of swallow grossly appears WNL. No oral phase deficits noted during evaluation. Indicators of Pharyngeal Phase Dysfunction   Pharyngeal Phase  Pharyngeal Phase: Exceptions  Indicators of Pharyngeal Phase Dysfunction  Throat Clearing - Immediate: Reg Solid; Thin - straw(x1 each, dry)  Pharyngeal Phase   Pharyngeal: Immediate dry throat clear noted post-swallow with TL via straw x1 and wtih regular solid x1. No coughing, wet vocal quality, throat clearing, or change in O2 / RR noted throughout remainder of PO trials. Pt's O2 sats consistent at 93-95% throughout. Pt denied globus sensation post-swallow wtih all PO.     Prognosis  Prognosis  Prognosis for safe diet advancement: good  Individuals consulted  Consulted and

## 2020-10-19 NOTE — PLAN OF CARE
Problem: Nutrition  Intervention: Swallowing evaluation  Note: Bedside swallow evaluation completed this date. Dede Benites M.S. [id number] [facility]  Speech-language pathologist  [id number]      Intervention: Aspiration precautions  Note: Bedside swallow evaluation completed this date.     Dede Benites M.S. [id number] [facility]  Speech-language pathologist  [id number]

## 2020-10-19 NOTE — PROGRESS NOTES
Writer sent message to cross cover regarding pt. Pt was adamant about wanting to be discharged. Writer spoke with Dr. Anne Brothers, whom spoke with pt. Pt is willing to stay over night. Pt allowed writer to replace tele monitor at this time. Geraldo Crenshaw.  10/19/2020

## 2020-10-19 NOTE — CONSULTS
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 Weakness noted by ED physician to be mild, NIHSS of 3 on presentation. BP relatively low, BG appropriate, CBC and PT/INR relatively unremarkable. Not on blood thinners, no known recent strokes, no clear hx of bleeding or recent procedures. CT head with some atherosclerotic plaques throughout posterior > anterior circulation, few regions of remote infarct bilaterally. CTA without LVO or significant stenosis. After scans, patient noting resolution and much closer to his baseline. Given his initial presentation within tPA window without absolute contraindications discussed risk vs benefit of treating these mild strokes. Some data suggests limited benefit in treating for NIHSS < 6. Now with this patient having resolving symptoms and back close to baseline would recommend against tPA given more risk than benefit of giving IV tPA with near normal exam.  Plan for TIA work up including lipid panel, Hgb A1c, TTE. Patient does not appear to be on a statin or anti-platelet agent though medication list may be outdated. Would start statin if LDL > 70. If symptoms completely resolve and later recur, time window for tPA can be restarted though if he has some mild residual symptoms that remain then worsen, initial LKN of ~ 12:00 AM would still apply. Please call for concern of significant clinical deterioration thought to be related to stroke.       Lisa Mena MD  PGY5, Vascular Neurology  Attending:  Dr. Kody Melgar

## 2020-10-19 NOTE — CONSULTS
In patient Neurology consult        [facility] Neurology      MD Tessie Duckworth  1955    Date of Service: 10/19/2020    Referring Physician: Franklin Yates MD      Reason for the consult and CC: Acute right-sided weakness. HPI:   The patient is a 77y.o.  years old male with history of lung cancer was admitted to the hospital last night with acute right-sided weakness. Symptoms started few hours prior to admission. He describes sudden onset of feeling weakness in his right arm and leg. Degree was severe and persistent. No triggers. No other associated symptoms. No headache, chest pain, dysphagia or dysarthria. No visual changes. No other relieving or aggravating factors. Symptoms persisted and he decided to come to the ER for evaluation. Initial CT of the head showed no acute stroke. CTA showed no large vessel occlusion. He was admitted. Today he denies any new symptoms. Slightly weaker on the right side. History of lung cancer diagnosed several years ago. No history of DVT or PE. He was not consistent with aspirin at home. Other review of system was unremarkable.       Family history: Contributory        Past Surgical History:   Procedure Laterality Date    APPENDECTOMY      LUNG CANCER SURGERY          Past Medical History:   Diagnosis Date    Cancer [facility])     lung ca 2001     Social History     Tobacco Use    Smoking status: Current Every Day Smoker     Packs/day: 2.00     Types: Cigarettes    Smokeless tobacco: Never Used   Substance Use Topics    Alcohol use: Yes     Comment: occasionally    Drug use: No     Allergies   Allergen Reactions    Peanut-Containing Drug Products      Current Facility-Administered Medications   Medication Dose Route Frequency Provider Last Rate Last Dose    sodium chloride flush 0.9 % injection 10 mL  10 mL Intravenous 2 times per day Franklin Yates MD   10 mL at 10/19/20 1226    sodium chloride flush 0.9 % injection 10 mL 10 mL Intravenous PRN Alena Robbins MD        polyethylene glycol [facility]) packet 17 g  17 g Oral Daily PRN Alena Robbins MD        promethazine (PHENERGAN) tablet 12.5 mg  12.5 mg Oral Q6H PRN Alena Robbins MD        Or    ondansetron [facility]) injection 4 mg  4 mg Intravenous Q6H PRN Alena Robbins MD        [START ON 10/20/2020] enoxaparin (LOVENOX) injection 40 mg  40 mg Subcutaneous Daily MD Jennifer Bonilla ON 10/20/2020] aspirin EC tablet 81 mg  81 mg Oral Daily Alena Robbins MD        Or   [place] Favorite Laurent Briones ON 10/20/2020] aspirin suppository 300 mg  300 mg Rectal Daily Alena Robbins MD        perflutren lipid microspheres (DEFINITY) injection 1.65 mg  1.5 mL Intravenous ONCE PRN Alena Robbins MD        atorvastatin (LIPITOR) tablet 80 mg  80 mg Oral Nightly Alena Robbins MD           ROS : A 10-14 system review of constitutional, cardiovascular, respiratory, eyes, musculoskeletal, endocrine, GI, ENT, skin, hematological, genitourinary, psychiatric and neurologic systems was obtained and updated today and is unremarkable except as mentioned in my HPI      Exam:     Constitutional:   Vitals:    10/19/20 0955 10/19/20 1015 10/19/20 1100 10/19/20 1221   BP: 138/89   132/89   Pulse: 69   69   Resp: 16   16   Temp: 98.1 °F (36.7 °C)   98.3 °F (36.8 °C)   TempSrc: Oral   Oral   SpO2: 93%   95%   Weight:   181 lb (82.1 kg)    Height:  6' 2\" (1.88 m)         General appearance:  Normal development and appear in no acute distress. Eye: No icterus. Fundus: Funduscopic examination cannot be performed due to COVID19 restrictions and precautions. Neck: supple  Cardiovascular: No lower leg edema with good pulsation. Mental Status:   Oriented to person, place, problem, and time. Memory: Aware of recent and remote event. Good immediate recall. Intact remote memory  Normal attention span and concentration.   Language: intact naming, repeating and fluency   Good fund of Knowledge. Aware of current events and vocabulary   Cranial Nerves:   II: Visual fields: Full. Pupils: equal, round, reactive to light  III,IV,VI: Extra Ocular Movements are intact. No nystagmus  V: Facial sensation is intact   VII: Facial strength and movements: intact and symmetric  VIII: Hearing: Intact  IX: Palate elevation is symmetric  XI: Shoulder shrug is intact  XII: Tongue movements are normal  Musculoskeletal: 5/5 in the left side and mildly weak on the right side. .   Tone: Normal tone. Reflexes: 2+ in the upper extremity and 2+ in the lower extremity   Planters: flexor bilaterally. Coordination: no pronator drift, no dysmetria with FNF in upper extremities. Normal REM. Sensation: normal to all modalities in both arms and legs. Gait/Posture: Not tested due to weakness. Data:  LABS:   Lab Results   Component Value Date     10/19/2020    K 4.0 10/19/2020    CL 88 10/19/2020    CO2 30 10/19/2020    BUN 8 10/19/2020    CREATININE 1.1 10/19/2020    GFRAA >60 10/19/2020    LABGLOM >60 10/19/2020    GLUCOSE 125 10/19/2020    CALCIUM 9.8 10/19/2020     Lab Results   Component Value Date    WBC 8.3 10/19/2020    RBC 4.88 10/19/2020    HGB 15.2 10/19/2020    HCT 44.7 10/19/2020    MCV 91.6 10/19/2020    RDW 14.4 10/19/2020     10/19/2020     Lab Results   Component Value Date    INR 1.01 10/19/2020    PROTIME 11.7 10/19/2020       Neuroimaging were independently reviewed by myself and discussed results with the patient  Reviewed notes from different physicians  Reviewed lab and blood testing    Impression:  Acute right-sided weakness, severe. Rule out TIA/CVA. So far cryptogenic  Hyponatremia  History of lung cancer  Smoking  Hyperglycemia  Hypertension    Recommendation:  MRI brain with and without contrast rule out any new stroke versus space-occupying lesion with history of lung cancer.   Echo  Aspirin  A1c  Telemetry  Blood sugar monitor and insulin sliding scale if needed  Speech and swallow evaluation  PT OT  Telemetry  DVT and GI prophylaxis  Nicotine patch  Hydration and recheck sodium level  Follow electrolytes  Neurochecks  Discussed risk of stroke, recurrence rate and risk of progression  We will follow  MDM: High complexity due to high risk strokes, risk of decompensation and worsening of his neurological status. Thank you for referring such patient. If you have any questions regarding my consult note, please don't hesitate to call me. Anthony Ribera MD  [phone]    This dictation was generated by voice recognition computer software.  Although all attempts are made to edit the dictation for accuracy, there may be errors in the  transcription that are not intended

## 2020-10-19 NOTE — PROGRESS NOTES
Patient admitted to room 439 from [facility]. Patient oriented to room, call light, bed rails, phone, lights and bathroom. Patient instructed about the schedule of the day including: vital sign frequency, lab draws, possible tests, frequency of MD and staff rounds, including RN/MD rounding together at bedside, daily weights, and I &O's. Patient instructed about prescribed diet, how to use 8MENU, and television. Telemetry box in place, patient aware of placement and reason. Bed locked, in lowest position, side rails up 2/4, call light within reach. Will continue to monitor. Perfect Serve sent to Dr. Ameena Ray that patient has arrived.

## 2020-10-19 NOTE — PROGRESS NOTES
/89   Pulse 69   Temp 98.1 °F (36.7 °C) (Oral)   Resp 16   Ht 6' 2\" (1.88 m)   Wt 181 lb (82.1 kg)   SpO2 93%   BMI 23.24 kg/m²  on room air. Pt resting quietly in bed. Pt denies pain, discomfort or shortness of breath. Lungs clear, diminished. NSR on tele. NIHSS scale 1, pt with R facial/eye droop, per pt from chemo from previous cancer treatment. Pt denies any needs at this time. Bedside table and call light within reach. Bed alarm in place and activated. Pt instructed to call out for any needs and assistance. Will continue to monitor.   [id number] Alexandria Rutherford  10/19/2020

## 2020-10-19 NOTE — PROGRESS NOTES
Pt passed swallow screen, speech at bedside, will place general diet, no straws per speech.   [id number] Chancellor Washburn  10/19/2020

## 2020-10-19 NOTE — PROGRESS NOTES
Writer sent message to Dr. Aram Hernandez regarding MRI and Dr. Winston Wesley note. Neeta Letters  10/19/2020    1530 writer received call from Dr. Aram Hernandez, will add contrast with MRI as well as without contrast.  Will await order. Pt can be med surg with tele.   Neeta Letters  10/19/2020

## 2020-10-19 NOTE — PROGRESS NOTES
Physical Therapy    Facility/Department: [facility] C4 PCU  Initial Assessment,Treatment, and Discharge Summary    NAME: Opal Banuelos  : 1955  MRN: [id number]    Date of Service: 10/19/2020    Discharge Recommendations:  Home with assist PRN   PT Equipment Recommendations  Equipment Needed: No    Assessment   Body structures, Functions, Activity limitations: Decreased functional mobility   Assessment: Pt is a 76 y/o male presenting to [facility] with right sided weakness. PTA, pt was IND with all functional mobility and does not use an AD. Pt currently a farmer and very active. Pt reporting his symptoms have subsided and demos equal strength and ROM. Pt demos no LOB and able to clean his BLE. Pt is IND with all observed mobility and has no concerns with d/c home when medically appropriate. Pt will be discharged from skilled PT d/t at baseline and no needs. Recommend home with PRN assist as needed. Treatment Diagnosis: Decreased functional mobility  Prognosis: Good  Decision Making: Low Complexity  PT Education: Goals;PT Role;Plan of Care  Patient Education: Pt verbalized understanding. REQUIRES PT FOLLOW UP: No  Activity Tolerance  Activity Tolerance: Patient Tolerated treatment well       Patient Diagnosis(es): There were no encounter diagnoses. has a past medical history of Cancer ([address] has a past surgical history that includes Appendectomy and Lung cancer surgery. Restrictions  Restrictions/Precautions  Restrictions/Precautions: General Precautions, Up as Tolerated  Vision/Hearing  Vision: Impaired  Vision Exceptions: Wears glasses for reading  Hearing: Within functional limits     Subjective  General  Chart Reviewed: Yes  Patient assessed for rehabilitation services?: Yes  Response To Previous Treatment: Not applicable  Family / Caregiver Present: No  Referring Practitioner: Vitor Adorno MD  Referral Date : 10/19/20  Subjective  Subjective: Pt agreeable to therapy.   Pain Screening  Patient Currently in Pain: Denies  Vital Signs  Patient Currently in Pain: No       Orientation  Orientation  Overall Orientation Status: Within Normal Limits  Social/Functional History  Social/Functional History  Lives With: Family(Son, daughter-in-law and granddaughter, 24hr assist available)  Type of Home: House  Home Layout: Two level, Able to Live on Main level with bedroom/bathroom  Home Access: Stairs to enter without rails  Entrance Stairs - Number of Steps: 2 JOSELITO  Bathroom Shower/Tub: Walk-in shower  Bathroom Toilet: Standard  Bathroom Equipment: Shower chair  Home Equipment: Crutches  ADL Assistance: Independent  Homemaking Assistance: Independent  Homemaking Responsibilities: No  Ambulation Assistance: Independent(no AD)  Transfer Assistance: Independent  Active : Yes  Occupation: Full time employment  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 [employer]  Additional Comments: Denies falls in past 3 months  Cognition   Cognition  Overall Cognitive Status: WFL    Objective          AROM RLE (degrees)  RLE AROM: WFL  AROM LLE (degrees)  LLE AROM : WFL  Strength RLE  Strength RLE: WNL  Comment: Grossly 4+/5  Strength LLE  Strength LLE: WNL  Comment: Grossly 4+/5  Tone RLE  RLE Tone: Normotonic  Tone LLE  LLE Tone: Normotonic  Sensation  Overall Sensation Status: WFL  Bed mobility  Supine to Sit: Independent  Sit to Supine: Unable to assess(Pt up in chair at end of session.)  Transfers  Sit to Stand: Independent  Stand to sit: Independent  Bed to Chair: Independent  Comment: IND without AD. Ambulation  Ambulation?: Yes  More Ambulation?: No  Ambulation 1  Surface: level tile  Device: No Device  Assistance: Independent  Quality of Gait: Pt demos decreased rosina, but no LOB.   Gait Deviations: Slow Rosina  Distance: 30 feet + 50 feet  Stairs/Curb  Stairs?: No     Balance  Posture: Good  Sitting - Static: Good  Sitting - Dynamic: Good  Standing - Static: Good;-  Standing - Dynamic: Good;-  Comments: Pt able to stand unsupported for over 8 minutes to clean his legs without UE support. Plan   Plan  Times per week: 1x only  Safety Devices  Type of devices: All fall risk precautions in place, Call light within reach, Chair alarm in place, Left in chair, Patient at risk for falls, Gait belt, Nurse notified    AM-PAC Score  AM-PAC Inpatient Mobility Raw Score : 23 (10/19/20 1255)  AM-PAC Inpatient T-Scale Score : 56.93 (10/19/20 1255)  Mobility Inpatient CMS 0-100% Score: 11.2 (10/19/20 1255)  Mobility Inpatient CMS G-Code Modifier : CI (10/19/20 1255)        Goals  Short term goals  Time Frame for Short term goals: All goals met by 10/19/20  Short term goal 1: Pt will be independent with ambulation to 50 feet. Short term goal 2: Pt will transfer with independence. Patient Goals   Patient goals : To go home. Therapy Time   Individual Concurrent Group Co-treatment   Time In 1128         Time Out 1150         Minutes 22         Timed Code Treatment Minutes: 12 Minutes(10 minute eval)     This will serve as the discharge summary.      Bijan Lebron, PT

## 2020-10-19 NOTE — PROGRESS NOTES
Occupational Therapy  OT order received. Pt reports that KANDY is functioning at baseline with strength and coordination. He had no c/o tingling or numbness. He reports independence with ADLs and mobility.  OT will sign off per pt request.   Erika Rush OT

## 2020-10-20 VITALS
BODY MASS INDEX: 23.23 KG/M2 | HEART RATE: 72 BPM | OXYGEN SATURATION: 94 % | SYSTOLIC BLOOD PRESSURE: 118 MMHG | TEMPERATURE: 98 F | WEIGHT: 181 LBS | DIASTOLIC BLOOD PRESSURE: 85 MMHG | RESPIRATION RATE: 18 BRPM | HEIGHT: 74 IN

## 2020-10-20 LAB
ANION GAP SERPL CALCULATED.3IONS-SCNC: 9 MMOL/L (ref 3–16)
BUN BLDV-MCNC: 7 MG/DL (ref 7–20)
CALCIUM SERPL-MCNC: 8.8 MG/DL (ref 8.3–10.6)
CHLORIDE BLD-SCNC: 96 MMOL/L (ref 99–110)
CHOLESTEROL, TOTAL: 157 MG/DL (ref 0–199)
CO2: 25 MMOL/L (ref 21–32)
CREAT SERPL-MCNC: 0.7 MG/DL (ref 0.8–1.3)
GFR AFRICAN AMERICAN: >60
GFR NON-AFRICAN AMERICAN: >60
GLUCOSE BLD-MCNC: 174 MG/DL (ref 70–99)
HCT VFR BLD CALC: 41.7 % (ref 40.5–52.5)
HDLC SERPL-MCNC: 47 MG/DL (ref 40–60)
HEMOGLOBIN: 14.1 G/DL (ref 13.5–17.5)
LDL CHOLESTEROL CALCULATED: 94 MG/DL
LV EF: 55 %
LVEF MODALITY: NORMAL
MCH RBC QN AUTO: 30.5 PG (ref 26–34)
MCHC RBC AUTO-ENTMCNC: 33.8 G/DL (ref 31–36)
MCV RBC AUTO: 90.2 FL (ref 80–100)
PDW BLD-RTO: 14.3 % (ref 12.4–15.4)
PLATELET # BLD: 260 K/UL (ref 135–450)
PMV BLD AUTO: 6.1 FL (ref 5–10.5)
POTASSIUM REFLEX MAGNESIUM: 3.8 MMOL/L (ref 3.5–5.1)
RBC # BLD: 4.62 M/UL (ref 4.2–5.9)
SODIUM BLD-SCNC: 130 MMOL/L (ref 136–145)
TRIGL SERPL-MCNC: 81 MG/DL (ref 0–150)
VLDLC SERPL CALC-MCNC: 16 MG/DL
WBC # BLD: 4.8 K/UL (ref 4–11)

## 2020-10-20 PROCEDURE — 93306 TTE W/DOPPLER COMPLETE: CPT

## 2020-10-20 PROCEDURE — 36415 COLL VENOUS BLD VENIPUNCTURE: CPT

## 2020-10-20 PROCEDURE — 80048 BASIC METABOLIC PNL TOTAL CA: CPT

## 2020-10-20 PROCEDURE — 80061 LIPID PANEL: CPT

## 2020-10-20 PROCEDURE — 83036 HEMOGLOBIN GLYCOSYLATED A1C: CPT

## 2020-10-20 PROCEDURE — 6360000002 HC RX W HCPCS: Performed by: INTERNAL MEDICINE

## 2020-10-20 PROCEDURE — 85027 COMPLETE CBC AUTOMATED: CPT

## 2020-10-20 PROCEDURE — 2580000003 HC RX 258: Performed by: INTERNAL MEDICINE

## 2020-10-20 PROCEDURE — 6370000000 HC RX 637 (ALT 250 FOR IP): Performed by: INTERNAL MEDICINE

## 2020-10-20 PROCEDURE — 99233 SBSQ HOSP IP/OBS HIGH 50: CPT | Performed by: PSYCHIATRY & NEUROLOGY

## 2020-10-20 RX ORDER — ASPIRIN 81 MG/1
81 TABLET ORAL DAILY
Qty: 30 TABLET | Refills: 1 | Status: SHIPPED | OUTPATIENT
Start: 2020-10-21

## 2020-10-20 RX ORDER — ATORVASTATIN CALCIUM 80 MG/1
80 TABLET, FILM COATED ORAL NIGHTLY
Qty: 30 TABLET | Refills: 1 | Status: SHIPPED | OUTPATIENT
Start: 2020-10-20 | End: 2020-12-23 | Stop reason: SDUPTHER

## 2020-10-20 RX ADMIN — Medication 10 ML: at 08:51

## 2020-10-20 RX ADMIN — ASPIRIN 81 MG: 81 TABLET ORAL at 08:49

## 2020-10-20 RX ADMIN — ENOXAPARIN SODIUM 40 MG: 40 INJECTION SUBCUTANEOUS at 08:48

## 2020-10-20 ASSESSMENT — PAIN SCALES - GENERAL
PAINLEVEL_OUTOF10: 0

## 2020-10-20 NOTE — CARE COORDINATION
CASE MANAGEMENT INITIAL ASSESSMENT      Reviewed chart and completed assessment with: patient  Explained Case Management role/services. Primary contact information: Mariah Carter - son    Health Care Decision Maker:  Who do you trust or have selected to make healthcare decisions for you? Name:   Suellen Dillon  Phone  Number:  [phone]  Can this person be reached and be able to respond quickly, such as within a few minutes or hours? Yes    Admit date/status: 10/19/20 Inpatient   Diagnosis: TIA   Is this a Readmission?:  No      Insurance: none     Living arrangements, Adls, care needs, prior to admission: lives in a house with his son, daughter in law and granddaughter    Transportation: patient      Durable Medical Equipment at home:  none      Services in the home and/or outpatient, prior to admission: none    Dialysis Facility (if applicable) no  · Name:  · Address:  · Dialysis Schedule:  · Phone:  · Fax:    PT/OT recs: none    Hospital Exemption Notification (HEN): not initiated     Barriers to discharge: none    Plan/comments: 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 He is independent at home. He denies any services or DME at home. Discussed his lack of insurance. He states he is planning on signing up for Medicare since it is open enrollment. Provided patient with the information on how to enroll in Medicare. Also provided him with PCP list and [facility] information. Of note, per Juancho Enamorado in financial, patient does not qualify for Medicaid. No other needs identified at this time.

## 2020-10-20 NOTE — PROGRESS NOTES
[facility] Radiology regarding patient's CT of his chest. Asked them if results could be interpreted and reported on. They stated they were waiting to see prior scans before interpretation. I requested that they interpret without priors due to none found in patient's chart.

## 2020-10-20 NOTE — PLAN OF CARE
Problem: HEMODYNAMIC STATUS  Goal: Patient has stable vital signs and fluid balance  Outcome: Met This Shift  Note: Vital signs stable this shift; O2 sats greater than 92% on room air. Problem: ACTIVITY INTOLERANCE/IMPAIRED MOBILITY  Goal: Mobility/activity is maintained at optimum level for patient  Outcome: Met This Shift  Intervention: ASSESS FOR BARRIERS TO MOBILITY/ACTIVITY  Note: Pt up ambulating in room, to bathroom, up to chair; independent/independent after set up. Pt steady gait. Problem: COMMUNICATION IMPAIRMENT  Goal: Ability to express needs and understand communication  Outcome: Met This Shift  Intervention: ASSESS PATIENT'S ABILITY TO UNDERSTAND INFORMATION  Note: Pt able to understand and communicate all information, no expressive or receptive aphasia noted. Patient has a past medical history of Cancer ([facility] [address] Comorbidities and risk factors for stroke reviewed and education provided as appropriate. Patient and/or family's stated goal of care:  to go home      Reviewed the Following Education with Patient and/or Family:   Signs and Symptoms of Stroke-Reviewed FAST   Facial Drooping   Arm Weakness   Speech Difficulty   Time to Call 911 and how to activate EMS (911)   Importance of Follow Up Appointment at Discharge   Importance of Compliance with Medications Prescribed at Discharge     Pt verbalized understanding.      Family Present during Education: yes     Stroke Education Booklet at Bedside: yes     Total Education Time: 10 Minutes

## 2020-10-20 NOTE — PLAN OF CARE
Problem: HEMODYNAMIC STATUS  Goal: Patient has stable vital signs and fluid balance  10/20/2020 0000 by Jamal Gruber RN  Outcome: Ongoing  10/19/2020 2005 by Meir De La Torre RN  Outcome: Met This Shift  Note: Vital signs stable this shift; O2 sats greater than 92% on room air. Problem: ACTIVITY INTOLERANCE/IMPAIRED MOBILITY  Goal: Mobility/activity is maintained at optimum level for patient  10/20/2020 0000 by Jamal Gruber RN  Outcome: Ongoing  10/19/2020 2005 by Meir De La Torre RN  Outcome: Met This Shift  Intervention: ASSESS FOR BARRIERS TO MOBILITY/ACTIVITY  10/19/2020 2005 by Meir De La Torre RN  Note: Pt up ambulating in room, to bathroom, up to chair; independent/independent after set up. Pt steady gait. Problem: COMMUNICATION IMPAIRMENT  Goal: Ability to express needs and understand communication  10/20/2020 0000 by Jamal Gruber RN  Outcome: Ongoing  10/19/2020 2005 by Meir De La Torre RN  Outcome: Met This Shift  Intervention: ASSESS PATIENT'S ABILITY TO UNDERSTAND INFORMATION  10/19/2020 2005 by Meir De La Torre RN  Note: Pt able to understand and communicate all information, no expressive or receptive aphasia noted. Patient has a past medical history of Cancer ([facility] [address] Comorbidities and risk factors for stroke reviewed and education provided as appropriate. Patient and/or family's stated goal of care:  to go home      Reviewed the Following Education with Patient and/or Family:   Signs and Symptoms of Stroke-Reviewed FAST   Facial Drooping   Arm Weakness   Speech Difficulty   Time to Call 911 and how to activate EMS (911)   Importance of Follow Up Appointment at Discharge   Importance of Compliance with Medications Prescribed at Discharge     Pt verbalized understanding.      Family Present during Education: yes     Stroke Education Booklet at Bedside: yes     Total Education Time: 10 Minutes          Problem: Falls - Risk of:  Goal: Will remain free from falls  Description: Will remain free from falls  Outcome: Ongoing  Goal: Absence of physical injury  Description: Absence of physical injury  Outcome: Ongoing     Problem: Nutrition  Intervention: Swallowing evaluation  10/19/2020 1035 by ABRAM Herrera  Note: Bedside swallow evaluation completed this date. PAULINA Herrera  Speech-language pathologist  [id number]      Intervention: Aspiration precautions  10/19/2020 1035 by ABRAM Herrera  Note: Bedside swallow evaluation completed this date.     PAULINA Herrera  Speech-language pathologist  [id number]

## 2020-10-20 NOTE — PROGRESS NOTES
Referral sent to [facility] to send patient 30 day monitor. Pt has to set up payment plan and number given to pt nurse Valorie Newsome. She will give to patient to set up plan.

## 2020-10-20 NOTE — DISCHARGE SUMMARY
surgical resection with suspect this may be a new lesion. The most recent CT scan available in the electronic record is from 2011 through [facility] which showed right sided scarring but no significant disease on the left. He previously followed with Dr. Mary Anthony through [facility], [place], operated by [facility] oncology. Assessment/Plan:    Acute Ischemic CVA, left lacunar thalamic infarct: Transient right-sided weakness which has fully resolved. Brain MRI showed left lacunar thalamic ischemia. CTA head and neck negative for any significant carotid stenosis. He does have some severe right subclavian stenosis raising the possibility of subclavian steal in the right clinical setting. However this would not explain decreased flow to the left hemisphere. PT/OT evals completed with no need for further therapy. Telemetry negative. BP stabe w/o treatment. ECHO unremarkable. 4 week cardiac monitor provided. Recommend starting ASA/Statin therapy. Follow-up with Neurology in 2-3 weeks. Lung mass: Remote history of right-sided lung cancer status post surgical resection and chemotherapy. Previously followed by Dr. Mary Anthony through [address] [place] [address]. The CTA head and neck has incidentally demonstrated a left upper lobe cavitary mass. Given his smoking history cannot exclude new or recurrent malignancy. Recommended formal chest CT; demonstrates apparently new lung mass on the left, although has some chronic features. Recommended further evaluation by Pulmonology and/or Oncology while hospitalized, although patient declined. 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 He can follow-up with his Oncologist and/or a Pulmonologist, ideally where they have access to his old images for comparison. Hyponatremia: Mild at 127. Etiology unclear. Lung mass noted, although this appears to be more likely chronic process and less likely malignant.  Na improving. Exam:   /85   Pulse 72   Temp 98 °F (36.7 °C) (Oral)   Resp 18   Ht 6' 2\" (1.88 m)   Wt 181 lb (82.1 kg)   SpO2 94%   BMI 23.24 kg/m²   General appearance: No apparent distress, appears stated age and cooperative. HEENT:  Normal cephalic, atraumatic without obvious deformity. Pupils equal, round, and reactive to light. Extra ocular muscles intact. Conjunctivae/corneas clear. Neck: Supple, no jugular venous distention. Trachea midline with full range of motion. Respiratory:  Normal respiratory effort. Clear to auscultation, bilaterally without Rales/Wheezes/Rhonchi. Cardiovascular: Regular rate and rhythm with normal S1/S2 without murmurs, rubs or gallops. Abdomen: Soft, non-tender, non-distended with normal bowel sounds. Musculoskelatal: No clubbing, cyanosis or edema bilaterally. Full range of motion without deformity. Neurologic:  Neurovascularly intact without any focal sensory/motor deficits. Cranial nerves: II-XII intact, grossly non-focal.  Psychiatric: Alert and oriented, thought content appropriate, normal insight  Skin: Surgical scar right posterior chest wall. Skin color, texture, turgor normal.  No rashes or lesions. Capillary Refill: Brisk,< 3 seconds   Peripheral Pulses: +2 palpable, equal bilaterally       Patient Discharge Instructions: Follow up:  1. Primary Care Provider in the next 1-2 weeks.     Discharge Medications:   Discharge Medication List as of 10/20/2020  4:03 PM      START taking these medications    Details   aspirin 81 MG EC tablet Take 1 tablet by mouth daily, Disp-30 tablet,R-1Normal      atorvastatin (LIPITOR) 80 MG tablet Take 1 tablet by mouth nightly, Disp-30 tablet,R-1Normal           Discharge Medication List as of 10/20/2020  4:03 PM        Discharge Medication List as of 10/20/2020  4:03 PM      CONTINUE these medications which have NOT CHANGED    Details   EPINEPHrine (EPIPEN 2-KAMI) 0.3 MG/0.3ML SOAJ injection Inject 0.3 mLs into the 10/19/2020 5:09 pm TECHNIQUE: CT of the chest was performed without the administration of intravenous contrast. Multiplanar reformatted images are provided for review. Dose modulation, iterative reconstruction, and/or weight based adjustment of the mA/kV was utilized to reduce the radiation dose to as low as reasonably achievable. COMPARISON: None available at time of dictation. HISTORY: ORDERING SYSTEM PROVIDED HISTORY: History of right sided lung cancer, new GIANFRANCO mass TECHNOLOGIST PROVIDED HISTORY: Reason for exam:->History of right sided lung cancer, new GIANFRANCO mass Reason for Exam: History of right sided lung cancer, new GIANFRANCO mass Acuity: Acute Type of Exam: Initial Relevant Medical/Surgical History: right side lung removed FINDINGS: Mediastinum: No mediastinal or hilar adenopathy is identified. Coronary artery calcification is present. Lungs/pleura: Prior right upper lobe lobectomy is noted. There is a stable 3.1 x 1.4 cm calcification in the right lower lobe. Emphysematous changes are present. In the left upper lobe apex, there is an irregularly-shaped cavitary lesion measuring 3.3 x 1.6 cm. Its wall is thin, although irregular. The internal component of the cavitation is fairly dense, likely partially calcified, therefore likely longstanding. Emphysematous changes are also noted in the left lung. Upper Abdomen: The adrenal glands are unremarkable. There is a 12 mm cyst in the medial segment of the left lobe. No follow-up is recommended. Soft Tissues/Bones: No lytic or blastic bone lesions are appreciated. Status post right upper lobe lobectomy with expected postsurgical changes. Left upper lobe cavitary lesion, likely long-standing with internal calcification. Mycetoma/Aspergilloma are considered less likely. There are no recent comparison studies. Therefore, 3 month follow-up is suggested.      Xr Chest Portable    Result Date: 10/19/2020  EXAMINATION: ONE XRAY VIEW OF THE CHEST 10/19/2020 1:53 am COMPARISON: None. HISTORY: ORDERING SYSTEM PROVIDED HISTORY: stroke like symptoms TECHNOLOGIST PROVIDED HISTORY: Reason for exam:->stroke like symptoms Reason for Exam: stroke protocol Acuity: Acute Type of Exam: Unknown Additional signs and symptoms: rt side weakness FINDINGS: No pneumothorax, pleural effusion or consolidative airspace disease. There seems to be some linear scarring at the lung apices, not well delineated. Same dense small nodular opacities also present. Surgical clips overlie the low right neck. Normal heart size and mediastinal contours. No acute osseous abnormality. No acute cardiopulmonary findings. Probably some scarring at the lung apices, along with some punctate calcified granulomas. Cta Head Neck W Contrast    Result Date: 10/19/2020  EXAMINATION: CTA OF THE HEAD AND NECK WITH CONTRAST 10/19/2020 1:33 am: TECHNIQUE: CTA of the head and neck was performed with the administration of intravenous contrast. Multiplanar reformatted images are provided for review. MIP images are provided for review. Stenosis of the internal carotid arteries measured using NASCET criteria. Dose modulation, iterative reconstruction, and/or weight based adjustment of the mA/kV was utilized to reduce the radiation dose to as low as reasonably achievable. COMPARISON: None. HISTORY: ORDERING SYSTEM PROVIDED HISTORY: right sided deficits TECHNOLOGIST PROVIDED HISTORY: Reason for exam:->right sided deficits Reason for Exam: stroke protocol Acuity: Acute Type of Exam: Unknown Additional signs and symptoms: rt side weakness FINDINGS: CTA NECK: AORTIC ARCH/ARCH VESSELS: Atherosclerosis in the visualized thoracic aorta. No evidence of aneurysm or dissection in the visualized thoracic aorta. Atherosclerosis of the right brachiocephalic and left subclavian arteries without hemodynamically significant stenosis.   Focal occlusion/high-grade stenosis in the proximal right subclavian artery with reconstitution of flow just proximal to the origin of the right vertebral artery. CAROTID ARTERIES: Atherosclerosis of the bilateral common carotid arteries without hemodynamically significant stenosis, acute injury or dissection. Bilateral carotid bulb atherosclerotic plaque. No stenosis of the bilateral cervical internal carotid arteries per NASCET criteria. VERTEBRAL ARTERIES: No dissection, arterial injury, or significant stenosis. SOFT TISSUES: The lung apices demonstrate respiratory motion, linear scarring, emphysema and bullous disease. Left upper lobe heterogeneous 4.5 x 2.0 cm masslike opacity with some cavitation may relate to patient history of lung cancer. No cervical or superior mediastinal lymphadenopathy. The larynx and pharynx are unremarkable. No acute abnormality of the salivary and thyroid glands. BONES: No acute osseous abnormality. Multilevel degenerative changes in the visualized spine. CTA HEAD: ANTERIOR CIRCULATION: No significant stenosis of the intracranial internal carotid, anterior cerebral, or middle cerebral arteries. No aneurysm. Atherosclerosis of the bilateral intracranial internal carotid arteries without hemodynamically significant stenosis. POSTERIOR CIRCULATION: No significant stenosis of the vertebral, basilar, or posterior cerebral arteries. No aneurysm. OTHER: No dural venous sinus thrombosis on this non-dedicated study. BRAIN: No mass effect or midline shift. No extra-axial fluid collection. The gray-white differentiation is maintained. 1.  No large vessel occlusion. No intracranial proximal large artery hemodynamically significant stenosis, occlusion or aneurysm. 2.  No stenosis of the bilateral cervical internal carotid arteries per NASCET criteria. Bilateral carotid bulb atherosclerotic plaque. 3.  Focal high-grade stenosis/occlusion of the proximal right subclavian artery with reconstitution of flow just proximal to the right vertebral artery origin.   Subclavian steal syndrome should be considered in the appropriate clinical setting. 4.  Heterogeneous left upper lobe 4.5 cm masslike opacity with some cavitation may relate to patient history of lung cancer. Correlation with chest CT may be helpful. Critical results were called by Dr. Joelle Russ Sessions to Scarlett Mcnamara on 10/19/2020 at 02:38. Mri Brain W Wo Contrast    Result Date: 10/19/2020  EXAMINATION: MRI OF THE BRAIN WITHOUT AND WITH CONTRAST  10/19/2020 4:10 pm TECHNIQUE: Multiplanar multisequence MRI of the head/brain was performed without and with the administration of intravenous contrast. COMPARISON: CT head earlier today. HISTORY: ORDERING SYSTEM PROVIDED HISTORY: TIA/CVA, right sided weakness TECHNOLOGIST PROVIDED HISTORY: Reason for exam:->TIA/CVA, right sided weakness Reason for Exam: TIA/CVA, right sided weakness Acuity: Acute Type of Exam: Unknown Additional signs and symptoms: hx of lung ca FINDINGS: INTRACRANIAL STRUCTURES/VENTRICLES:  There is a subcentimeter focus of diffusion restriction and faint T2/FLAIR hyperintensity in the posterior left thalamus. No additional diffusion restriction is evident. Mild chronic white matter microvascular ischemic changes are present. There is no intracranial mass. No mass effect or midline shift. No evidence of an acute intracranial hemorrhage. The ventricles and sulci are normal in size and configuration. The sellar/suprasellar regions appear unremarkable. The normal signal voids within the major intracranial vessels appear maintained. No abnormal focus of enhancement is seen within the brain. The cerebellar tonsils protrude 6 mm below the level of the foramen magnum without significant crowding. ORBITS: The visualized portion of the orbits demonstrate no acute abnormality. SINUSES: Paranasal sinuses are well aerated. Bilateral mastoid effusions are present.  BONES/SOFT TISSUES: The bone marrow signal intensity appears normal. The soft tissues demonstrate no acute abnormality. 1. Acute ischemic lacunar infarct in the left thalamus. 2. No hemorrhage or mass effect. 3. Mild chronic white matter microvascular ischemic changes. 4. No evidence of intracranial metastatic disease. 5. Mild Chiari 1 malformation. 6. Bilateral mastoid effusions. Consults:     IP CONSULT TO NEUROLOGY    Labs: For convenience and continuity at follow-up the following most recent labs are provided:    Lab Results   Component Value Date    WBC 4.8 10/20/2020    HGB 14.1 10/20/2020    HCT 41.7 10/20/2020    MCV 90.2 10/20/2020     10/20/2020     10/20/2020    K 3.8 10/20/2020    CL 96 10/20/2020    CO2 25 10/20/2020    BUN 7 10/20/2020    CREATININE 0.7 10/20/2020    CALCIUM 8.8 10/20/2020    TROPONINI <0.01 10/19/2020    ALKPHOS 60 10/19/2020    ALT 15 10/19/2020    AST 23 10/19/2020    BILITOT 0.4 10/19/2020    LABALBU 4.6 10/19/2020    LDLCALC 94 10/20/2020    TRIG 81 10/20/2020    LABA1C 5.8 10/20/2020     Lab Results   Component Value Date    INR 1.01 10/19/2020         The patient was seen and examined on day of discharge and this discharge summary is in conjunction with any daily progress note from day of discharge. Time spent on discharge is more than 30 minutes in the examination, evaluation, counseling and review of medications and discharge plan. Signed:    Whitney Hua MD   11/8/2020    Thank you No primary care provider on file. for the opportunity to be involved in this patient's care. If you have any questions or concerns please feel free to contact my office [phone].

## 2020-10-20 NOTE — PLAN OF CARE
Problem: HEMODYNAMIC STATUS  Goal: Patient has stable vital signs and fluid balance  10/20/2020 1122 by Mckayla Laguna RN  Outcome: Ongoing     Problem: ACTIVITY INTOLERANCE/IMPAIRED MOBILITY  Goal: Mobility/activity is maintained at optimum level for patient  10/20/2020 1122 by Mckyala Laguna RN  Outcome: Ongoing     Problem: COMMUNICATION IMPAIRMENT  Goal: Ability to express needs and understand communication  10/20/2020 1122 by Mckayla Laguna RN  Outcome: Ongoing

## 2020-10-20 NOTE — PROGRESS NOTES
[facility]  Neurology Follow-up  [facility] Neurology    Date of Service: 10/20/2020    Subjective:   CC: Follow up today regarding: Acute right-sided weakness and new stroke. Events noted. Chart and lab reviewed. The patient had his MRI of the brain which I did review today. MRI showed acute to subacute left thalamic stroke. No new symptoms today. The same right-sided weakness. Blood test reviewed and showed sodium 130 and glucose 174. He is currently on aspirin and statin. Blood pressure in the 136/91. He denies any headache or chest pain or dysphagia or dysarthria. Other review of system was unremarkable. ROS : A 10-12 system review obtained and updated today and is unremarkable except as mentioned  in my interval history.      Family history: Noncontributory    Past Medical History:   Diagnosis Date    Cancer [facility])     lung ca 2001     Current Facility-Administered Medications   Medication Dose Route Frequency Provider Last Rate Last Dose    sodium chloride flush 0.9 % injection 10 mL  10 mL Intravenous 2 times per day Harvinder Reyes MD   10 mL at 10/20/20 0851    sodium chloride flush 0.9 % injection 10 mL  10 mL Intravenous PRN Harvinder Reyes MD        polyethylene glycol [facility]) packet 17 g  17 g Oral Daily PRN Harvinder Reyes MD        promethazine (PHENERGAN) tablet 12.5 mg  12.5 mg Oral Q6H PRN Harvinder Reyes MD        Or    ondansetron [facility]) injection 4 mg  4 mg Intravenous Q6H PRN Harvinder Reyes MD        enoxaparin (LOVENOX) injection 40 mg  40 mg Subcutaneous Daily Harvinder Reyes MD   40 mg at 10/20/20 0848    aspirin EC tablet 81 mg  81 mg Oral Daily Harvinder Reyes MD   81 mg at 10/20/20 4212    Or    aspirin suppository 300 mg  300 mg Rectal Daily Harvinder Reyes MD        perflutren lipid microspheres (DEFINITY) injection 1.65 mg  1.5 mL Intravenous ONCE PRN Harvinder Reyes MD        atorvastatin (LIPITOR) tablet 80 mg  80 mg Oral Nightly Giovana Ryan MD   80 mg at 10/19/20 2019     Allergies   Allergen Reactions    Peanut-Containing Drug Products       reports that he has been smoking cigarettes. He has been smoking about 2.00 packs per day. He has never used smokeless tobacco. He reports current alcohol use. He reports that he does not use drugs. Objective:  Exam:   Constitutional:   Vitals:    10/19/20 2018 10/19/20 2324 10/20/20 0337 10/20/20 0845   BP: 92/67 131/88 135/89 (!) 136/91   Pulse: 70 69 71 61   Resp: 18 16 18 18   Temp: 98.2 °F (36.8 °C) 97.6 °F (36.4 °C) 97.9 °F (36.6 °C) 97.9 °F (36.6 °C)   TempSrc: Oral Oral Oral Oral   SpO2: 93% 95% 93% 97%   Weight:       Height:         General appearance:  Normal development and appear in no acute distress. Eye: No icterus. Neck: supple  Cardiovascular:  No lower leg edema with good pulsation. Mental Status:   Oriented to person, place, problem, and time. Memory: Good immediate recall. Intact remote memory  Normal attention span and concentration. Language: intact naming, repeating and fluency   Good fund of Knowledge. Cranial Nerves:   II: Visual fields: Full. Pupils: equal, round, reactive to light  III,IV,VI: Extra Ocular Movements are intact. No nystagmus  V: Facial sensation is intact  VII: Facial strength and movements: intact and symmetric  IX: Palate elevation is symmetric  XI: Shoulder shrug is intact  XII: Tongue movements are normal  Musculoskeletal: No weakness today. Tone: Normal tone. Reflexes: Symmetric 2+ in both arms and legs. Coordination: Right pronator drift, no dysmetria with FNF. Normal REM. Sensation: normal to all modalities in both arms and legs.   Gait/Posture: Steady gait        Data:  LABS:   Lab Results   Component Value Date     10/20/2020    K 3.8 10/20/2020    CL 96 10/20/2020    CO2 25 10/20/2020    BUN 7 10/20/2020    CREATININE 0.7 10/20/2020    GFRAA >60 10/20/2020    LABGLOM >60 10/20/2020    GLUCOSE 174 10/20/2020    CALCIUM 8.8 10/20/2020     Lab Results   Component Value Date    WBC 4.8 10/20/2020    RBC 4.62 10/20/2020    HGB 14.1 10/20/2020    HCT 41.7 10/20/2020    MCV 90.2 10/20/2020    RDW 14.3 10/20/2020     10/20/2020     Lab Results   Component Value Date    INR 1.01 10/19/2020    PROTIME 11.7 10/19/2020       Neuroimaging was independently reviewed by me and discussed results with the patient  I reviewed blood testing and other test results and discussed results with the patient      Impression:  Acute right-sided weakness secondary to new ischemic left thalamic stroke. Thromboembolic versus cardioembolic  Hyponatremia, improved  History of lung cancer  Hypertension  Hyperlipidemia        Recommendation  Aspirin  Statin  Speech and swallow evaluation  PT and OT  Blood pressure monitor for now  Advised the patient to monitor blood pressure at home. If blood pressure above or at 140/90, he should be starting blood pressure medication  Smoking cessation  Follow sodium level  Telemetry  DVT and GI prophylaxis  Echo  Check A1c  Blood sugar monitor  We will consider event monitor for 4 weeks to rule out paroxysmal A. fib given lack of significant thromboembolic source so far in addition to hypercoagulable. Can be discharged when medically stable  Follow-up with me in 2 to 3 weeks for further stroke prevention. Will need new PCP  DC planning when medically stable. Anthony Ribera MD   [phone]      This dictation was generated by voice recognition computer software. Although all attempts are made to edit the dictation for accuracy, there may be errors in the transcription that are not intended.

## 2020-10-21 LAB
ESTIMATED AVERAGE GLUCOSE: 119.8 MG/DL
HBA1C MFR BLD: 5.8 %

## 2020-12-02 ENCOUNTER — OFFICE VISIT (OUTPATIENT)
Dept: FAMILY MEDICINE CLINIC | Age: 65
End: 2020-12-02
Payer: MEDICARE

## 2020-12-02 VITALS
DIASTOLIC BLOOD PRESSURE: 98 MMHG | OXYGEN SATURATION: 98 % | BODY MASS INDEX: 23.23 KG/M2 | SYSTOLIC BLOOD PRESSURE: 138 MMHG | HEIGHT: 74 IN | TEMPERATURE: 98.9 F | RESPIRATION RATE: 14 BRPM | WEIGHT: 181 LBS | HEART RATE: 91 BPM

## 2020-12-02 LAB
ANION GAP SERPL CALCULATED.3IONS-SCNC: 7 MMOL/L (ref 3–16)
APTT: 40.1 SEC (ref 24.2–36.2)
BUN BLDV-MCNC: 9 MG/DL (ref 7–20)
CALCIUM SERPL-MCNC: 9.2 MG/DL (ref 8.3–10.6)
CHLORIDE BLD-SCNC: 92 MMOL/L (ref 99–110)
CO2: 30 MMOL/L (ref 21–32)
CREAT SERPL-MCNC: 0.7 MG/DL (ref 0.8–1.3)
GFR AFRICAN AMERICAN: >60
GFR NON-AFRICAN AMERICAN: >60
GLUCOSE BLD-MCNC: 114 MG/DL (ref 70–99)
HCT VFR BLD CALC: 44.3 % (ref 40.5–52.5)
HEMOGLOBIN: 15 G/DL (ref 13.5–17.5)
INR BLD: 0.96 (ref 0.86–1.14)
MCH RBC QN AUTO: 30.1 PG (ref 26–34)
MCHC RBC AUTO-ENTMCNC: 33.8 G/DL (ref 31–36)
MCV RBC AUTO: 89.2 FL (ref 80–100)
PDW BLD-RTO: 14.2 % (ref 12.4–15.4)
PLATELET # BLD: 265 K/UL (ref 135–450)
PMV BLD AUTO: 7 FL (ref 5–10.5)
POTASSIUM SERPL-SCNC: 4.8 MMOL/L (ref 3.5–5.1)
PROTHROMBIN TIME: 11.1 SEC (ref 10–13.2)
RBC # BLD: 4.97 M/UL (ref 4.2–5.9)
SODIUM BLD-SCNC: 129 MMOL/L (ref 136–145)
WBC # BLD: 5.2 K/UL (ref 4–11)

## 2020-12-02 PROCEDURE — G8420 CALC BMI NORM PARAMETERS: HCPCS | Performed by: NURSE PRACTITIONER

## 2020-12-02 PROCEDURE — 99214 OFFICE O/P EST MOD 30 MIN: CPT | Performed by: NURSE PRACTITIONER

## 2020-12-02 PROCEDURE — G8427 DOCREV CUR MEDS BY ELIG CLIN: HCPCS | Performed by: NURSE PRACTITIONER

## 2020-12-02 PROCEDURE — 4040F PNEUMOC VAC/ADMIN/RCVD: CPT | Performed by: NURSE PRACTITIONER

## 2020-12-02 PROCEDURE — 3017F COLORECTAL CA SCREEN DOC REV: CPT | Performed by: NURSE PRACTITIONER

## 2020-12-02 PROCEDURE — 36415 COLL VENOUS BLD VENIPUNCTURE: CPT | Performed by: NURSE PRACTITIONER

## 2020-12-02 PROCEDURE — 1123F ACP DISCUSS/DSCN MKR DOCD: CPT | Performed by: NURSE PRACTITIONER

## 2020-12-02 PROCEDURE — G8484 FLU IMMUNIZE NO ADMIN: HCPCS | Performed by: NURSE PRACTITIONER

## 2020-12-02 PROCEDURE — 4004F PT TOBACCO SCREEN RCVD TLK: CPT | Performed by: NURSE PRACTITIONER

## 2020-12-02 RX ORDER — EPINEPHRINE 0.3 MG/.3ML
0.3 INJECTION SUBCUTANEOUS
Qty: 1 EACH | Refills: 1 | Status: SHIPPED | OUTPATIENT
Start: 2020-12-02 | End: 2021-07-16 | Stop reason: SDUPTHER

## 2020-12-02 ASSESSMENT — PATIENT HEALTH QUESTIONNAIRE - PHQ9
2. FEELING DOWN, DEPRESSED OR HOPELESS: 0
SUM OF ALL RESPONSES TO PHQ QUESTIONS 1-9: 0
SUM OF ALL RESPONSES TO PHQ9 QUESTIONS 1 & 2: 0
SUM OF ALL RESPONSES TO PHQ QUESTIONS 1-9: 0
1. LITTLE INTEREST OR PLEASURE IN DOING THINGS: 0
SUM OF ALL RESPONSES TO PHQ QUESTIONS 1-9: 0

## 2020-12-02 NOTE — PROGRESS NOTES
History and Physical      Chaz Boateng  YOB: 1955    Date of Service:  12/2/2020    Chief Complaint:   Chaz Boateng is a 77 y.o. male who presents for complete physical examination. HPI:     Establish Care; He reports he was in the hospital over a month ago for a stroke. He believes his stroke was caused by \"a blood clot\". He did not go to follow up care due to being in the middle of harvest season. He appears to be a poor historian of healthcare services. He states he never had a real PCP he goes to urgent care when needed. Lung Ca: He reports surgery and chemo treatment in 2001. He states he has gone back for follow up care but can not recall when his last follow up was. Smoker: He currently smokes 2 ppd. He started smoking in his teens. He reports there was never a time were he quit smoking for an extended period. He is in the pre-contemplation stage of change. He is a  of 8 years. He has 3 grown children. He lives with his son, wife, and granddaughter. He has a peanut allergy. Cardiac: he denies any history of chest pain, pressure, or irregular heart beats. He states he has a brother that \"kept passing out\" but is not sure of any other cardiac family history. He declined the need to see a cardiologist and after extensive education on why that may be of importance to him he did say he would discuss it with his son. I did provide a referral for cardiology with a note as to the reason why on the referral for his son. He states he is consistently taking his aspirin daily. Lab Results   Component Value Date    LVEF 55 10/20/2020         Neurology: he states he doesn't know why he had a stroke but knows he doesn't want to have another. He states he does not want to go have a follow up with a neurologist at this time. He has equal  strength and bilateral equal foot push/pull.       Possible Hypertension: He states his blood pressure has never been a problem and he is just nervous about being in the office today. On review of his hospitalization BP record: Wt Readings from Last 3 Encounters:   12/02/20 181 lb (82.1 kg)   10/19/20 181 lb (82.1 kg)   10/19/20 189 lb 1.6 oz (85.8 kg)     BP Readings from Last 3 Encounters:   12/02/20 (!) 138/98   10/20/20 118/85   10/19/20 (!) 126/98       Patient Active Problem List   Diagnosis    TIA (transient ischemic attack)    Lung mass    Arterial ischemic stroke, ICA, right, acute ([facility] [address]    Smoker    Dyslipidemia    Hyponatremia    Lung cancer [facility])       Preventive Care:  Health Maintenance   Topic Date Due    AAA screen  1955    Hepatitis C screen  1955    DTaP/Tdap/Td vaccine (1 - Tdap) 07/30/1972    Shingles Vaccine (1 of 2) 07/30/2003    Colon cancer screen colonoscopy  07/30/2003    Pneumococcal 65+ years Vaccine (1 of 1 - PPSV23) 07/30/2018    Flu vaccine (1) 12/02/2021 (Originally 9/1/2020)    A1C test (Diabetic or Prediabetic)  10/20/2021    Lipid screen  10/20/2021    Hepatitis A vaccine  Aged Out    Hepatitis B vaccine  Aged Out    Hib vaccine  Aged Out    Meningococcal (ACWY) vaccine  Aged Out      Self-testicular exams: Yes  Sexual activity: none   Last eye exam:  Normal: \"Passed drivers license renal test\"   Exercise: no regular exercise  Seatbelt use: no, never have. Lipid panel:    Lab Results   Component Value Date    CHOL 157 10/20/2020    TRIG 81 10/20/2020    HDL 47 10/20/2020    [address] 10/20/2020       Living will: yes,   copy requested      There is no immunization history on file for this patient.     Allergies   Allergen Reactions    Peanut-Containing Drug Products      Outpatient Medications Marked as Taking for the 12/2/20 encounter (Office Visit) with STEPHY Gabriel CNP   Medication Sig Dispense Refill    EPINEPHrine (EPIPEN 2-KAMI) 0.3 MG/0.3ML SOAJ injection Inject 0.3 mLs into the muscle once as needed (severe allergic reaction) Inject into lateral thigh muscle. 1 each 1    aspirin 81 MG EC tablet Take 1 tablet by mouth daily 30 tablet 1    atorvastatin (LIPITOR) 80 MG tablet Take 1 tablet by mouth nightly 30 tablet 1       Past Medical History:   Diagnosis Date    Cancer ([address]     lung ca 2001    Stroke (cerebrum) [facility])      Past Surgical History:   Procedure Laterality Date    APPENDECTOMY      LUNG CANCER SURGERY       History reviewed. No pertinent family history. Social History     Socioeconomic History    Marital status:       Spouse name: Not on file    Number of children: Not on file    Years of education: Not on file    Highest education level: Not on file   Occupational History    Not on file   Social Needs    Financial resource strain: Not on file    Food insecurity     Worry: Not on file     Inability: Not on file    Transportation needs     Medical: Not on file     Non-medical: Not on file   Tobacco Use    Smoking status: Current Every Day Smoker     Packs/day: 2.00     Years: 50.00     Pack years: 100.00     Types: Cigarettes    Smokeless tobacco: Never Used   Substance and Sexual Activity    Alcohol use: Yes     Comment: occasionally    Drug use: No    Sexual activity: Not on file   Lifestyle    Physical activity     Days per week: Not on file     Minutes per session: Not on file    Stress: Not on file   Relationships    Social connections     Talks on phone: Not on file     Gets together: Not on file     Attends Cheondoism service: Not on file     Active member of club or organization: Not on file     Attends meetings of clubs or organizations: Not on file     Relationship status: Not on file    Intimate partner violence     Fear of current or ex partner: Not on file     Emotionally abused: Not on file     Physically abused: Not on file     Forced sexual activity: Not on file   Other Topics Concern    Not on file   Social History Narrative    Not on file       Review of Systems:  A comprehensive review of systems noted.  Psychiatric: He has a normal mood and affect. His speech is normal and behavior is normal. Cognition and memory are normal.     Assessment/Plan:    Surjit SDarryl was seen today for established new doctor. Diagnoses and all orders for this visit:    H/O peanut allergy  -     EPINEPHrine (EPIPEN 2-KAMI) 0.3 MG/0.3ML SOAJ injection; Inject 0.3 mLs into the muscle once as needed (severe allergic reaction) Inject into lateral thigh muscle. Arterial ischemic stroke [facility])  -     Brijesh Us MD, Cardiology, [facility]  -     Protime/INR & PTT; Future  -     CBC; Future  -     Basic Metabolic Panel;  Future  -     Protime/INR & PTT  -     CBC  -     Basic Metabolic Panel    Encounter to establish care    Smoker unmotivated to quit

## 2020-12-02 NOTE — PATIENT INSTRUCTIONS
Patient Education        Peanut Allergy: Care Instructions  Your Care Instructions     When you have a peanut allergy and you eat peanuts, your body reacts as if the peanuts are trying to cause harm. It fights back by setting off an allergic reaction. A mild reaction may include a few raised, red, itchy patches of skin (called hives). A severe reaction may cause hives all over, swelling in the throat, trouble breathing, nausea or vomiting, or fainting. This is called anaphylaxis (say \"VYE-pv-qlo-Domingo\"). It can be deadly. A good way to prevent an allergic reaction is to avoid the foods that cause it. Peanuts might be found in chili and vegetable oils. An allergy doctor or a dietitian may be able to help you understand which foods might be okay and what to avoid. Learn what to do if you have a reaction. Follow-up care is a key part of your treatment and safety. Be sure to make and go to all appointments, and call your doctor if you are having problems. It's also a good idea to know your test results and keep a list of the medicines you take. How can you care for yourself at home? During a mild reaction  · Take an over-the-counter antihistamine, such as diphenhydramine (Benadryl) or loratadine (Claritin), as your doctor recommends. If you have a severe reaction, you also might be given one of these antihistamines. During a severe reaction  · Call for emergency help. A serious reaction is an emergency. · Give yourself an epinephrine shot. Make sure it is with you at all times. To prevent future reactions  · Avoid the foods that cause problems. And try not to use utensils or cookware that may have been in contact with food that you are allergic to. · Teach your family members, coworkers, and friends what to do if you have a severe reaction to a food that you are allergic to. · Wear medical alert jewelry that lists your allergies. You can buy this at most [employer]. When should you call for help?    Give an epinephrine shot if:    · You think you are having a severe allergic reaction. After you give an epinephrine shot, call 911, even if you feel better. Call 911 anytime you think you may need emergency care. For example, call if:    · You have symptoms of a severe allergic reaction. These may include:  ? Sudden raised, red areas (hives) all over your body. ? Swelling of the throat, mouth, lips, or tongue. ? Trouble breathing. ? Passing out (losing consciousness). Or you may feel very lightheaded or suddenly feel weak, confused, or restless.     · You have been given an epinephrine shot, even if you feel better. Call your doctor now or seek immediate medical care if:    · You have symptoms of an allergic reaction, such as:  ? A rash or hives (raised, red areas on the skin). ? Itching. ? Swelling. ? Belly pain, nausea, or vomiting. Watch closely for changes in your health, and be sure to contact your doctor if:    · You do not get better as expected. Where can you learn more? Go to [email or web address] org and sign in to your [employer] account. Enter P200 in the "[employer]" box to learn more about \"Peanut Allergy: Care Instructions. \"     If you do not have an account, please click on the \"Sign Up Now\" link. Current as of: June 29, 2020               Content Version: 12.6  © 8552-4360 [employer]. Care instructions adapted under license by [facility] [employer] [facility] ([facility]). If you have questions about a medical condition or this instruction, always ask your healthcare professional. [place] [address] any warranty or liability for your use of this information.

## 2020-12-21 NOTE — TELEPHONE ENCOUNTER
No future appointments. Past appointment was 12/2/20    Request for atorvastatin 80 mg, take one tablet by mouth daily. Patient was given rx when he was in the hospital and now needs refill.

## 2020-12-23 RX ORDER — ATORVASTATIN CALCIUM 80 MG/1
80 TABLET, FILM COATED ORAL NIGHTLY
Qty: 30 TABLET | Refills: 1 | Status: SHIPPED | OUTPATIENT
Start: 2020-12-23 | End: 2020-12-23

## 2020-12-23 RX ORDER — ATORVASTATIN CALCIUM 80 MG/1
80 TABLET, FILM COATED ORAL NIGHTLY
Qty: 30 TABLET | Refills: 1 | Status: SHIPPED | OUTPATIENT
Start: 2020-12-23 | End: 2021-02-25

## 2021-02-25 RX ORDER — ATORVASTATIN CALCIUM 80 MG/1
80 TABLET, FILM COATED ORAL NIGHTLY
Qty: 30 TABLET | Refills: 0 | Status: SHIPPED | OUTPATIENT
Start: 2021-02-25 | End: 2021-03-25

## 2021-04-23 RX ORDER — ATORVASTATIN CALCIUM 80 MG/1
80 TABLET, FILM COATED ORAL NIGHTLY
Qty: 30 TABLET | Refills: 0 | Status: SHIPPED | OUTPATIENT
Start: 2021-04-23 | End: 2021-05-25

## 2021-05-25 RX ORDER — ATORVASTATIN CALCIUM 80 MG/1
80 TABLET, FILM COATED ORAL NIGHTLY
Qty: 30 TABLET | Refills: 3 | Status: SHIPPED | OUTPATIENT
Start: 2021-05-25 | End: 2021-09-20

## 2021-07-16 ENCOUNTER — OFFICE VISIT (OUTPATIENT)
Dept: FAMILY MEDICINE CLINIC | Age: 66
End: 2021-07-16
Payer: MEDICARE

## 2021-07-16 VITALS
TEMPERATURE: 97.7 F | SYSTOLIC BLOOD PRESSURE: 148 MMHG | OXYGEN SATURATION: 98 % | DIASTOLIC BLOOD PRESSURE: 88 MMHG | BODY MASS INDEX: 23.74 KG/M2 | HEIGHT: 74 IN | HEART RATE: 79 BPM | WEIGHT: 185 LBS

## 2021-07-16 DIAGNOSIS — R91.8 LUNG MASS: ICD-10-CM

## 2021-07-16 DIAGNOSIS — Z12.11 COLON CANCER SCREENING: ICD-10-CM

## 2021-07-16 DIAGNOSIS — Z91.010 H/O PEANUT ALLERGY: ICD-10-CM

## 2021-07-16 DIAGNOSIS — Z72.0 TOBACCO ABUSE: ICD-10-CM

## 2021-07-16 DIAGNOSIS — Z91.81 AT HIGH RISK FOR FALLS: ICD-10-CM

## 2021-07-16 DIAGNOSIS — C34.90 MALIGNANT NEOPLASM OF LUNG, UNSPECIFIED LATERALITY, UNSPECIFIED PART OF LUNG (HCC): Primary | ICD-10-CM
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
## 2021-07-16 DIAGNOSIS — R03.0 ELEVATED BLOOD-PRESSURE READING WITHOUT DIAGNOSIS OF HYPERTENSION: ICD-10-CM

## 2021-07-16 PROCEDURE — 3017F COLORECTAL CA SCREEN DOC REV: CPT | Performed by: NURSE PRACTITIONER

## 2021-07-16 PROCEDURE — 4004F PT TOBACCO SCREEN RCVD TLK: CPT | Performed by: NURSE PRACTITIONER

## 2021-07-16 PROCEDURE — 99214 OFFICE O/P EST MOD 30 MIN: CPT | Performed by: NURSE PRACTITIONER

## 2021-07-16 PROCEDURE — G8420 CALC BMI NORM PARAMETERS: HCPCS | Performed by: NURSE PRACTITIONER

## 2021-07-16 PROCEDURE — 4040F PNEUMOC VAC/ADMIN/RCVD: CPT | Performed by: NURSE PRACTITIONER

## 2021-07-16 PROCEDURE — G8427 DOCREV CUR MEDS BY ELIG CLIN: HCPCS | Performed by: NURSE PRACTITIONER

## 2021-07-16 PROCEDURE — 1123F ACP DISCUSS/DSCN MKR DOCD: CPT | Performed by: NURSE PRACTITIONER

## 2021-07-16 RX ORDER — EPINEPHRINE 0.3 MG/.3ML
0.3 INJECTION SUBCUTANEOUS
Qty: 1 EACH | Refills: 1 | Status: SHIPPED | OUTPATIENT
Start: 2021-07-16 | End: 2022-04-26

## 2021-07-16 ASSESSMENT — ENCOUNTER SYMPTOMS
NAUSEA: 0
VOMITING: 0
COUGH: 0
SHORTNESS OF BREATH: 0
DIARRHEA: 0

## 2021-07-16 ASSESSMENT — PATIENT HEALTH QUESTIONNAIRE - PHQ9
2. FEELING DOWN, DEPRESSED OR HOPELESS: 0
SUM OF ALL RESPONSES TO PHQ9 QUESTIONS 1 & 2: 0
SUM OF ALL RESPONSES TO PHQ QUESTIONS 1-9: 0
1. LITTLE INTEREST OR PLEASURE IN DOING THINGS: 0
SUM OF ALL RESPONSES TO PHQ QUESTIONS 1-9: 0
SUM OF ALL RESPONSES TO PHQ QUESTIONS 1-9: 0

## 2021-07-16 NOTE — PATIENT INSTRUCTIONS
· Schedule ultrasound to screening for AAA  · Schedule lung CT of chest to check mass seen on CT scan in October 2020  · Monitor BP at home, goal < 140/80  · Follow up in 3 months

## 2021-07-16 NOTE — PROGRESS NOTES
swelling. Gastrointestinal: Negative for diarrhea, nausea and vomiting. Neurological: Negative for dizziness and headaches. Prior to Visit Medications    Medication Sig Taking? Authorizing Provider   EPINEPHrine (EPIPEN 2-KAMI) 0.3 MG/0.3ML SOAJ injection Inject 0.3 mLs into the muscle once as needed (severe allergic reaction) Inject into lateral thigh muscle. Yes Sivan Home, APRN - CNP   atorvastatin (LIPITOR) 80 MG tablet Take 1 tablet by mouth nightly Yes Sivan Home, APRN - CNP   aspirin 81 MG EC tablet Take 1 tablet by mouth daily Yes Renée Prabhakar MD        Social History     Tobacco Use    Smoking status: Current Every Day Smoker     Packs/day: 2.00     Years: 50.00     Pack years: 100.00     Types: Cigarettes    Smokeless tobacco: Never Used   Substance Use Topics    Alcohol use: Yes     Comment: occasionally        Vitals:    07/16/21 1414 07/16/21 1420 07/16/21 1446   BP: (!) 170/88 (!) 176/103 (!) 148/88   Site: Left Upper Arm Left Upper Arm    Position: Sitting Sitting    Cuff Size: Medium Adult Medium Adult    Pulse: 79     Temp: 97.7 °F (36.5 °C)     SpO2: 98%     Weight: 185 lb (83.9 kg)     Height: 6' 2\" (1.88 m)       Estimated body mass index is 23.75 kg/m² as calculated from the following:    Height as of this encounter: 6' 2\" (1.88 m). Weight as of this encounter: 185 lb (83.9 kg). Physical Exam  Vitals and nursing note reviewed. Constitutional:       General: He is not in acute distress. Appearance: Normal appearance. He is well-developed. He is not ill-appearing, toxic-appearing or diaphoretic. HENT:      Head: Normocephalic and atraumatic. Eyes:      Extraocular Movements: Extraocular movements intact. Conjunctiva/sclera: Conjunctivae normal.      Pupils: Pupils are equal, round, and reactive to light. Cardiovascular:      Rate and Rhythm: Normal rate and regular rhythm. Heart sounds: Normal heart sounds. No murmur heard.    No friction rub. No gallop. Pulmonary:      Effort: Pulmonary effort is normal. No respiratory distress. Breath sounds: No stridor. Wheezing present. No rhonchi or rales. Neurological:      General: No focal deficit present. Mental Status: He is alert and oriented to person, place, and time. Mental status is at baseline. Cranial Nerves: No cranial nerve deficit. ASSESSMENT/PLAN:  1. Malignant neoplasm of lung, unspecified laterality, unspecified part of lung ([address]  - CT CHEST WO CONTRAST    2. Lung mass  - CT CHEST WO CONTRAST    3. Tobacco abuse  - US SCREENING FOR AAA; Future    4. H/O peanut allergy  - EPINEPHrine (EPIPEN 2-KAMI) 0.3 MG/0.3ML SOAJ injection; Inject 0.3 mLs into the muscle once as needed (severe allergic reaction) Inject into lateral thigh muscle. Dispense: 1 each; Refill: 1    5. Colon cancer screening  - COLONOSCOPY W/ OR W/O BIOPSY    6. Fall, initial encounter    7. Elevated blood-pressure reading without diagnosis of hypertension    Chest CT for follow up on GIANFRANCO mass  AAA screening  BP elevated today, no hx of htn. Asked to monitor at home and call if remains >150/90  Not ready to quit- counseled on cessation  Offered sacral/coccyx XR due to pain from fall- declines    Return in about 3 months (around 10/16/2021). An electronic signature was used to authenticate this note. --STEPHY James - CNP on 7/16/2021 at 3:27 PM  On the basis of positive falls risk screening, assessment and plan is as follows: home safety tips provided.

## 2021-09-07 DIAGNOSIS — Z87.891 PERSONAL HISTORY OF TOBACCO USE: Primary | ICD-10-CM

## 2021-09-07 PROCEDURE — G0296 VISIT TO DETERM LDCT ELIG: HCPCS | Performed by: NURSE PRACTITIONER

## 2021-09-16 ENCOUNTER — HOSPITAL ENCOUNTER (OUTPATIENT)
Dept: CT IMAGING | Age: 66
Discharge: HOME | End: 2021-09-16
Payer: MEDICARE

## 2021-09-16 DIAGNOSIS — Z87.891 PERSONAL HISTORY OF TOBACCO USE: ICD-10-CM

## 2021-09-16 PROCEDURE — 71271 CT THORAX LUNG CANCER SCR C-: CPT

## 2021-11-17 NOTE — TELEPHONE ENCOUNTER
LOV 7/16/2021    .   Future Appointments   Date Time Provider Swapna Rodriguez   11/18/2021  1:00 PM Brissa Moyer APRN - CNP [place] [id number] [facility]

## 2021-11-19 RX ORDER — ATORVASTATIN CALCIUM 80 MG/1
80 TABLET, FILM COATED ORAL NIGHTLY
Qty: 30 TABLET | Refills: 2 | Status: SHIPPED | OUTPATIENT
Start: 2021-11-19 | End: 2022-01-14

## 2021-12-08 NOTE — TELEPHONE ENCOUNTER
No future appointments.   Visit date not found  12/02/2020 Onset: 12/7/2021   Location / description: 's all day. Taking in fluids. Ate small meals today.  @ 1943. feeling confused, tired and urination more freq and thirsty. Instructed to go to ER, home alone and no ride. Instructed to call 911 and verbalized understanding.   Precipitating Factors: na  Pain Scale (1-10), 10 highest: 0/10  Associated Symptoms: na  What improves/worsens symptoms: na  Symptom specific medications: insulin lispro (HumaLOG) 100 UNIT/ML injectable solution, has pump. Last dose 1945 2.075 units.   LMP : No LMP recorded (lmp unknown). Patient is postmenopausal.  Are you pregnant or breast feeding: na  Recent visits (last 3-4 weeks) for same reason or recent surgery:  na    PLAN:  Call 911    Patient/Caller agrees to follow recommendations.    Reason for Disposition  • Patient sounds very sick or weak to the triager     Call 911    Protocols used: DIABETES - HIGH BLOOD SUGAR-A-

## 2021-12-16 ENCOUNTER — TELEPHONE (OUTPATIENT)
Dept: FAMILY MEDICINE CLINIC | Age: 66
End: 2021-12-16

## 2022-01-14 RX ORDER — ATORVASTATIN CALCIUM 80 MG/1
80 TABLET, FILM COATED ORAL NIGHTLY
Qty: 30 TABLET | Refills: 0 | Status: SHIPPED | OUTPATIENT
Start: 2022-01-14 | End: 2022-03-15

## 2022-01-14 NOTE — TELEPHONE ENCOUNTER
Please ask pt to schedule follow up appointment. He is due for blood work to check cholesterol and last time he was in BP was high so want to follow up on that as well.

## 2022-03-15 RX ORDER — ATORVASTATIN CALCIUM 80 MG/1
80 TABLET, FILM COATED ORAL NIGHTLY
Qty: 30 TABLET | Refills: 0 | Status: SHIPPED | OUTPATIENT
Start: 2022-03-15 | End: 2022-03-15

## 2022-03-15 RX ORDER — ATORVASTATIN CALCIUM 80 MG/1
TABLET, FILM COATED ORAL
Qty: 90 TABLET | Refills: 0 | Status: SHIPPED | OUTPATIENT
Start: 2022-03-15 | End: 2022-09-19

## 2022-03-24 ENCOUNTER — OFFICE VISIT (OUTPATIENT)
Dept: FAMILY MEDICINE CLINIC | Age: 67
End: 2022-03-24
Payer: MEDICARE

## 2022-03-24 VITALS
RESPIRATION RATE: 16 BRPM | BODY MASS INDEX: 27.98 KG/M2 | OXYGEN SATURATION: 96 % | HEIGHT: 74 IN | DIASTOLIC BLOOD PRESSURE: 84 MMHG | HEART RATE: 70 BPM | TEMPERATURE: 97.5 F | SYSTOLIC BLOOD PRESSURE: 150 MMHG | WEIGHT: 218 LBS

## 2022-03-24 DIAGNOSIS — E78.00 HYPERCHOLESTEREMIA: ICD-10-CM

## 2022-03-24 DIAGNOSIS — R05.9 COUGH: ICD-10-CM

## 2022-03-24 DIAGNOSIS — K59.00 CONSTIPATION, UNSPECIFIED CONSTIPATION TYPE: Primary | ICD-10-CM

## 2022-03-24 DIAGNOSIS — Z13.1 DIABETES MELLITUS SCREENING: ICD-10-CM

## 2022-03-24 DIAGNOSIS — Z72.0 TOBACCO ABUSE: ICD-10-CM

## 2022-03-24 DIAGNOSIS — I10 PRIMARY HYPERTENSION: ICD-10-CM

## 2022-03-24 DIAGNOSIS — Z12.5 PROSTATE CANCER SCREENING: ICD-10-CM

## 2022-03-24 DIAGNOSIS — C34.90 MALIGNANT NEOPLASM OF LUNG, UNSPECIFIED LATERALITY, UNSPECIFIED PART OF LUNG (HCC): ICD-10-CM

## 2022-03-24 LAB
A/G RATIO: 1.8 (ref 1.1–2.2)
ALBUMIN SERPL-MCNC: 4.5 G/DL (ref 3.4–5)
ALP BLD-CCNC: 115 U/L (ref 40–129)
ALT SERPL-CCNC: 20 U/L (ref 10–40)
ANION GAP SERPL CALCULATED.3IONS-SCNC: 12 MMOL/L (ref 3–16)
AST SERPL-CCNC: 20 U/L (ref 15–37)
BASOPHILS ABSOLUTE: 0 K/UL (ref 0–0.2)
BASOPHILS RELATIVE PERCENT: 0.6 %
BILIRUB SERPL-MCNC: 0.4 MG/DL (ref 0–1)
BUN BLDV-MCNC: 11 MG/DL (ref 7–20)
CALCIUM SERPL-MCNC: 9.5 MG/DL (ref 8.3–10.6)
CHLORIDE BLD-SCNC: 93 MMOL/L (ref 99–110)
CHOLESTEROL, TOTAL: 96 MG/DL (ref 0–199)
CO2: 27 MMOL/L (ref 21–32)
CREAT SERPL-MCNC: 0.7 MG/DL (ref 0.8–1.3)
EOSINOPHILS ABSOLUTE: 0.2 K/UL (ref 0–0.6)
EOSINOPHILS RELATIVE PERCENT: 3.9 %
GFR AFRICAN AMERICAN: >60
GFR NON-AFRICAN AMERICAN: >60
GLUCOSE BLD-MCNC: 119 MG/DL (ref 70–99)
HCT VFR BLD CALC: 43.9 % (ref 40.5–52.5)
HDLC SERPL-MCNC: 45 MG/DL (ref 40–60)
HEMOGLOBIN: 14.6 G/DL (ref 13.5–17.5)
LDL CHOLESTEROL CALCULATED: 40 MG/DL
LYMPHOCYTES ABSOLUTE: 0.9 K/UL (ref 1–5.1)
LYMPHOCYTES RELATIVE PERCENT: 16.4 %
MCH RBC QN AUTO: 29.2 PG (ref 26–34)
MCHC RBC AUTO-ENTMCNC: 33.2 G/DL (ref 31–36)
MCV RBC AUTO: 88 FL (ref 80–100)
MONOCYTES ABSOLUTE: 1.1 K/UL (ref 0–1.3)
MONOCYTES RELATIVE PERCENT: 20.2 %
NEUTROPHILS ABSOLUTE: 3.1 K/UL (ref 1.7–7.7)
NEUTROPHILS RELATIVE PERCENT: 58.9 %
PDW BLD-RTO: 16.1 % (ref 12.4–15.4)
PLATELET # BLD: 238 K/UL (ref 135–450)
PMV BLD AUTO: 6.8 FL (ref 5–10.5)
POTASSIUM SERPL-SCNC: 4.7 MMOL/L (ref 3.5–5.1)
PROSTATE SPECIFIC ANTIGEN: 18.28 NG/ML (ref 0–4)
RBC # BLD: 4.98 M/UL (ref 4.2–5.9)
SODIUM BLD-SCNC: 132 MMOL/L (ref 136–145)
TOTAL PROTEIN: 7 G/DL (ref 6.4–8.2)
TRIGL SERPL-MCNC: 57 MG/DL (ref 0–150)
VLDLC SERPL CALC-MCNC: 11 MG/DL
WBC # BLD: 5.2 K/UL (ref 4–11)

## 2022-03-24 PROCEDURE — 4040F PNEUMOC VAC/ADMIN/RCVD: CPT | Performed by: NURSE PRACTITIONER

## 2022-03-24 PROCEDURE — 3017F COLORECTAL CA SCREEN DOC REV: CPT | Performed by: NURSE PRACTITIONER

## 2022-03-24 PROCEDURE — 1123F ACP DISCUSS/DSCN MKR DOCD: CPT | Performed by: NURSE PRACTITIONER

## 2022-03-24 PROCEDURE — 4004F PT TOBACCO SCREEN RCVD TLK: CPT | Performed by: NURSE PRACTITIONER

## 2022-03-24 PROCEDURE — G8484 FLU IMMUNIZE NO ADMIN: HCPCS | Performed by: NURSE PRACTITIONER

## 2022-03-24 PROCEDURE — G8417 CALC BMI ABV UP PARAM F/U: HCPCS | Performed by: NURSE PRACTITIONER

## 2022-03-24 PROCEDURE — 99214 OFFICE O/P EST MOD 30 MIN: CPT | Performed by: NURSE PRACTITIONER

## 2022-03-24 PROCEDURE — G8427 DOCREV CUR MEDS BY ELIG CLIN: HCPCS | Performed by: NURSE PRACTITIONER

## 2022-03-24 RX ORDER — LISINOPRIL 10 MG/1
10 TABLET ORAL DAILY
Qty: 90 TABLET | Refills: 1 | Status: SHIPPED | OUTPATIENT
Start: 2022-03-24 | End: 2022-03-24

## 2022-03-24 RX ORDER — AMLODIPINE BESYLATE 5 MG/1
5 TABLET ORAL DAILY
Qty: 90 TABLET | Refills: 1 | Status: SHIPPED | OUTPATIENT
Start: 2022-03-24 | End: 2022-04-26 | Stop reason: ALTCHOICE

## 2022-03-24 RX ORDER — BENZONATATE 100 MG/1
100 CAPSULE ORAL 3 TIMES DAILY PRN
Qty: 30 CAPSULE | Refills: 0 | Status: SHIPPED | OUTPATIENT
Start: 2022-03-24 | End: 2022-04-03

## 2022-03-24 RX ORDER — METHYLPREDNISOLONE 4 MG/1
TABLET ORAL
Qty: 1 KIT | Refills: 0 | Status: SHIPPED | OUTPATIENT
Start: 2022-03-24 | End: 2022-04-26 | Stop reason: ALTCHOICE

## 2022-03-24 SDOH — ECONOMIC STABILITY: HOUSING INSECURITY
IN THE LAST 12 MONTHS, WAS THERE A TIME WHEN YOU DID NOT HAVE A STEADY PLACE TO SLEEP OR SLEPT IN A SHELTER (INCLUDING NOW)?: NO

## 2022-03-24 SDOH — ECONOMIC STABILITY: FOOD INSECURITY: WITHIN THE PAST 12 MONTHS, THE FOOD YOU BOUGHT JUST DIDN'T LAST AND YOU DIDN'T HAVE MONEY TO GET MORE.: NEVER TRUE

## 2022-03-24 SDOH — ECONOMIC STABILITY: FOOD INSECURITY: WITHIN THE PAST 12 MONTHS, YOU WORRIED THAT YOUR FOOD WOULD RUN OUT BEFORE YOU GOT MONEY TO BUY MORE.: NEVER TRUE

## 2022-03-24 SDOH — ECONOMIC STABILITY: TRANSPORTATION INSECURITY
IN THE PAST 12 MONTHS, HAS LACK OF TRANSPORTATION KEPT YOU FROM MEETINGS, WORK, OR FROM GETTING THINGS NEEDED FOR DAILY LIVING?: NO

## 2022-03-24 SDOH — ECONOMIC STABILITY: TRANSPORTATION INSECURITY
IN THE PAST 12 MONTHS, HAS THE LACK OF TRANSPORTATION KEPT YOU FROM MEDICAL APPOINTMENTS OR FROM GETTING MEDICATIONS?: NO

## 2022-03-24 SDOH — ECONOMIC STABILITY: INCOME INSECURITY: IN THE LAST 12 MONTHS, WAS THERE A TIME WHEN YOU WERE NOT ABLE TO PAY THE MORTGAGE OR RENT ON TIME?: NO

## 2022-03-24 ASSESSMENT — PATIENT HEALTH QUESTIONNAIRE - PHQ9
SUM OF ALL RESPONSES TO PHQ QUESTIONS 1-9: 0
SUM OF ALL RESPONSES TO PHQ QUESTIONS 1-9: 0
2. FEELING DOWN, DEPRESSED OR HOPELESS: 0
SUM OF ALL RESPONSES TO PHQ QUESTIONS 1-9: 0
SUM OF ALL RESPONSES TO PHQ9 QUESTIONS 1 & 2: 0
1. LITTLE INTEREST OR PLEASURE IN DOING THINGS: 0
DEPRESSION UNABLE TO ASSESS: FUNCTIONAL CAPACITY MOTIVATION LIMITS ACCURACY
SUM OF ALL RESPONSES TO PHQ QUESTIONS 1-9: 0

## 2022-03-24 ASSESSMENT — ENCOUNTER SYMPTOMS
NAUSEA: 0
DIARRHEA: 0
CONSTIPATION: 1
SHORTNESS OF BREATH: 0
VOMITING: 0
COUGH: 1

## 2022-03-24 ASSESSMENT — SOCIAL DETERMINANTS OF HEALTH (SDOH): HOW HARD IS IT FOR YOU TO PAY FOR THE VERY BASICS LIKE FOOD, HOUSING, MEDICAL CARE, AND HEATING?: NOT HARD AT ALL

## 2022-03-24 NOTE — PATIENT INSTRUCTIONS
Start blood pressure medicine  Medrol dose pack and cough medicine  Blood work today  Follow up in month for BP check

## 2022-03-24 NOTE — PROGRESS NOTES
3/24/2022     Chief Complaint   Patient presents with   RAZA [facility]     Gastrointestinal issues , not as bloated     Congestion    Cough     Heather Muse (:  1955) is a 74 y.o. male, here for evaluation of the following medical concerns:    HPI    Constipation on going for a few months, started using mineral oil nightly and that has helped with symptoms. Bloating has improved since constipation improved. Denies abdominal pain. He reports had colonoscopy previously, none for past several years- no record on any. Due to change in bowels recommended he have colonoscopy. HTN: blood pressure still high, has been elevated at last office visit. Reports never treated. Continues to smoke 1 PPD. Denies CP. Past week noticed increased cough and chest congestion. No increased dyspnea, fever, sore throat. Would like steroid taper and cough medication. Review of Systems   Constitutional: Negative for fatigue and fever. HENT: Positive for congestion. Respiratory: Positive for cough. Negative for shortness of breath. Cardiovascular: Negative for chest pain and leg swelling. Gastrointestinal: Positive for constipation. Negative for diarrhea, nausea and vomiting. Neurological: Negative for dizziness and headaches. Prior to Visit Medications    Medication Sig Taking? Authorizing Provider   lisinopril (PRINIVIL;ZESTRIL) 10 MG tablet Take 1 tablet by mouth daily Yes Jason IvanoSTEPHY reyes - CNP   methylPREDNISolone (MEDROL DOSEPACK) 4 MG tablet Take by mouth.  Yes Jason Ivanoff APRN - CNP   benzonatate (TESSALON) 100 MG capsule Take 1 capsule by mouth 3 times daily as needed for Cough Yes Jason IvanoSTEPHY reyes - CNP   atorvastatin (LIPITOR) 80 MG tablet TAKE 1 TABLET BY MOUTH EVERY NIGHT Yes Jason STEPHY Arvizu - CNP   aspirin 81 MG EC tablet Take 1 tablet by mouth daily Yes Melissa Cheney MD   EPINEPHrine (EPIPEN 2-KAMI) 0.3 MG/0.3ML SOAJ injection Inject 0.3 mLs into the muscle once as needed (severe allergic reaction) Inject into lateral thigh muscle. STEPHY Fox - ZION        Social History     Tobacco Use    Smoking status: Current Every Day Smoker     Packs/day: 2.00     Years: 50.00     Pack years: 100.00     Types: Cigarettes    Smokeless tobacco: Never Used   Substance Use Topics    Alcohol use: Yes     Comment: occasionally        Vitals:    03/24/22 1119 03/24/22 1125   BP: (!) 152/84 (!) 150/84   Site: Left Upper Arm Left Upper Arm   Position: Sitting Sitting   Pulse: 70    Resp: 16    Temp: 97.5 °F (36.4 °C)    TempSrc: Temporal    SpO2: 96%    Weight: 218 lb (98.9 kg)    Height: 6' 2\" (1.88 m)      Estimated body mass index is 27.99 kg/m² as calculated from the following:    Height as of this encounter: 6' 2\" (1.88 m). Weight as of this encounter: 218 lb (98.9 kg). Physical Exam  Vitals and nursing note reviewed. Constitutional:       General: He is not in acute distress. Appearance: Normal appearance. He is well-developed. He is not ill-appearing, toxic-appearing or diaphoretic. HENT:      Head: Normocephalic and atraumatic. Eyes:      Conjunctiva/sclera: Conjunctivae normal.      Pupils: Pupils are equal, round, and reactive to light. Cardiovascular:      Rate and Rhythm: Normal rate and regular rhythm. Heart sounds: Normal heart sounds. No murmur heard. No friction rub. No gallop. Pulmonary:      Effort: Pulmonary effort is normal. No respiratory distress. Breath sounds: Normal breath sounds. No stridor. No wheezing, rhonchi or rales. Abdominal:      General: Bowel sounds are normal. There is no distension. Palpations: Abdomen is soft. There is no mass. Tenderness: There is no abdominal tenderness. There is no guarding or rebound. Hernia: No hernia is present. Neurological:      General: No focal deficit present. Mental Status: He is alert and oriented to person, place, and time.  Mental status is at baseline. Cranial Nerves: No cranial nerve deficit. ASSESSMENT/PLAN:  1. Constipation, unspecified constipation type- miralax daily, schedule for colonoscopy    2. Primary hypertension- Will start amlodipine 5 mg (initally ordered lisinopril but has had issues with hyponatremia)  - CBC with Auto Differential; Future  - Comprehensive Metabolic Panel; Future    3. Cough- sterid taper and cough medication    - methylPREDNISolone (MEDROL DOSEPACK) 4 MG tablet; Take by mouth. Dispense: 1 kit; Refill: 0  - benzonatate (TESSALON) 100 MG capsule; Take 1 capsule by mouth 3 times daily as needed for Cough  Dispense: 30 capsule; Refill: 0    4. Diabetes mellitus screening  - Hemoglobin A1C; Future    5. Prostate cancer screening  - PSA Screening; Future    6. Hypercholesteremia  - Lipid Panel; Future    7. Malignant neoplasm of lung, unspecified laterality, unspecified part of lung ([facility] [address]- s/p lobectomy, had lung CT screening last year. 8. Tobacco abuse- unmotivated to quit      Return in about 1 month (around 4/24/2022) for Hypertension. An electronic signature was used to authenticate this note.     --STEPHY Sosa - CNP on 3/24/2022 at 12:43 PM

## 2022-03-25 DIAGNOSIS — R97.20 ELEVATED PSA, BETWEEN 10 AND LESS THAN 20 NG/ML: Primary | ICD-10-CM

## 2022-03-25 LAB
ESTIMATED AVERAGE GLUCOSE: 137 MG/DL
HBA1C MFR BLD: 6.4 %

## 2022-04-26 ENCOUNTER — OFFICE VISIT (OUTPATIENT)
Dept: FAMILY MEDICINE CLINIC | Age: 67
End: 2022-04-26
Payer: MEDICARE

## 2022-04-26 VITALS
WEIGHT: 220 LBS | HEART RATE: 74 BPM | BODY MASS INDEX: 28.23 KG/M2 | OXYGEN SATURATION: 98 % | DIASTOLIC BLOOD PRESSURE: 82 MMHG | SYSTOLIC BLOOD PRESSURE: 144 MMHG | HEIGHT: 74 IN

## 2022-04-26 DIAGNOSIS — R97.20 ELEVATED PSA, BETWEEN 10 AND LESS THAN 20 NG/ML: ICD-10-CM

## 2022-04-26 DIAGNOSIS — Z00.00 INITIAL MEDICARE ANNUAL WELLNESS VISIT: Primary | ICD-10-CM

## 2022-04-26 DIAGNOSIS — I10 PRIMARY HYPERTENSION: ICD-10-CM

## 2022-04-26 DIAGNOSIS — R73.03 PREDIABETES: ICD-10-CM

## 2022-04-26 DIAGNOSIS — E78.5 DYSLIPIDEMIA: ICD-10-CM

## 2022-04-26 PROCEDURE — 3017F COLORECTAL CA SCREEN DOC REV: CPT | Performed by: NURSE PRACTITIONER

## 2022-04-26 PROCEDURE — G0438 PPPS, INITIAL VISIT: HCPCS | Performed by: NURSE PRACTITIONER

## 2022-04-26 PROCEDURE — 4040F PNEUMOC VAC/ADMIN/RCVD: CPT | Performed by: NURSE PRACTITIONER

## 2022-04-26 PROCEDURE — 1123F ACP DISCUSS/DSCN MKR DOCD: CPT | Performed by: NURSE PRACTITIONER

## 2022-04-26 RX ORDER — LISINOPRIL 20 MG/1
TABLET ORAL
Qty: 30 TABLET | Refills: 5 | Status: SHIPPED | OUTPATIENT
Start: 2022-04-26 | End: 2022-04-26

## 2022-04-26 RX ORDER — LISINOPRIL 10 MG/1
TABLET ORAL
COMMUNITY
Start: 2022-03-24 | End: 2022-04-26

## 2022-04-26 RX ORDER — LISINOPRIL 20 MG/1
TABLET ORAL
Qty: 90 TABLET | Refills: 0 | Status: SHIPPED | OUTPATIENT
Start: 2022-04-26 | End: 2022-07-29

## 2022-04-26 ASSESSMENT — PATIENT HEALTH QUESTIONNAIRE - PHQ9
SUM OF ALL RESPONSES TO PHQ QUESTIONS 1-9: 0
2. FEELING DOWN, DEPRESSED OR HOPELESS: 0
SUM OF ALL RESPONSES TO PHQ QUESTIONS 1-9: 0
SUM OF ALL RESPONSES TO PHQ9 QUESTIONS 1 & 2: 0
1. LITTLE INTEREST OR PLEASURE IN DOING THINGS: 0
SUM OF ALL RESPONSES TO PHQ QUESTIONS 1-9: 0
SUM OF ALL RESPONSES TO PHQ QUESTIONS 1-9: 0

## 2022-04-26 ASSESSMENT — LIFESTYLE VARIABLES
HOW MANY STANDARD DRINKS CONTAINING ALCOHOL DO YOU HAVE ON A TYPICAL DAY: 1 OR 2
HOW OFTEN DO YOU HAVE A DRINK CONTAINING ALCOHOL: MONTHLY OR LESS

## 2022-04-26 NOTE — PROGRESS NOTES
Medicare Annual Wellness Visit    Roma Palacio is here for Medicare Swapna Gerber   Initial Medicare annual wellness visit  Primary hypertension- uncontrolled increase lisinopril  -     lisinopril (PRINIVIL;ZESTRIL) 20 MG tablet; TAKE 1 TABLET BY MOUTH DAILY, Disp-30 tablet, R-5Normal  Prediabetes- discussed a1c 6.4  Dyslipidemia- controlled  Elevated PSA, between 10 and less than 20 ng/ml- needs appointment with urology      Recommendations for Preventive Services Due: see orders and patient instructions/AVS.  Recommended screening schedule for the next 5-10 years is provided to the patient in written form: see Patient Instructions/AVS.     Return for Medicare Annual Wellness Visit in 1 year. Subjective     Hypertension: remains uncontrolled, taking lisinopril 10 mg daily    PSA: elevated 18- his son will call to schedule the appointment for him      Patient's complete Health Risk Assessment and screening values have been reviewed and are found in Flowsheets. The following problems were reviewed today and where indicated follow up appointments were made and/or referrals ordered.     Positive Risk Factor Screenings with Interventions:         Tobacco Use:     Tobacco Use: High Risk    Smoking Tobacco Use: Current Every Day Smoker    Smokeless Tobacco Use: Never Used     E-Cigarettes/Vaping Use     Questions Responses    E-Cigarette/Vaping Use Never User    Start Date     Passive Exposure     Quit Date     Counseling Given     Comments         Substance Use - Tobacco Interventions:  tobacco cessation tips and resources provided           General Health and ACP:  General  In general, how would you say your health is?: Good  In the past 7 days, have you experienced any of the following: New or Increased Pain, New or Increased Fatigue, Loneliness, Social Isolation, Stress or Anger?: (!) Yes  Select all that apply: (!) New or Increased Fatigue  Do you get the social and emotional support that you need?: Yes  Do you have a Living Will?: Yes    Advance Directives     Power of  Living Will ACP-Advance Directive ACP-Power of Jordan Triana on 12/02/20 Filed on 12/20/17 Filed [address] [facility] Meenakshi Devine Risk Interventions:  · Fatigue: patient declines any further evaluation/treatment for this issue    Health Habits/Nutrition:     Physical Activity: Sufficiently Active    Days of Exercise per Week: 7 days    Minutes of Exercise per Session: 60 min     Have you lost any weight without trying in the past 3 months?: No  Body mass index: (!) 28.24  Have you seen the dentist within the past year?: (!) No    Health Habits/Nutrition Interventions:  · Dental exam overdue:  patient encouraged to make appointment with his/her dentist    Hearing/Vision:  Do you or your family notice any trouble with your hearing that hasn't been managed with hearing aids?: No  Do you have difficulty driving, watching TV, or doing any of your daily activities because of your eyesight?: No  Have you had an eye exam within the past year?: (!) No  No exam data present    Hearing/Vision Interventions:  · Vision concerns:  patient declines any further evaluation/treatment for this issue    Safety:  Do you have working smoke detectors?: Yes  Do you have any tripping hazards - loose or unsecured carpets or rugs?: No  Do you have any tripping hazards - clutter in doorways, halls, or stairs?: No  Do you have either shower bars, grab bars, non-slip mats or non-slip surfaces in your shower or bathtub?: (!) No  Do all of your stairways have a railing or banister?: Yes  Do you always fasten your seatbelt when you are in a car?: (!) No    Safety Interventions:  · Home safety tips provided           Objective   Vitals:    04/26/22 1110 04/26/22 1113   BP: (!) 152/84 (!) 144/82   Site: Left Lower Arm Right Upper Arm   Position: Sitting Sitting   Cuff Size: Medium Adult Medium Adult   Pulse: 74    SpO2: 98%    Weight: 220 lb (99.8 kg)    Height: 6' 2\" (1.88 m)       Body mass index is 28.25 kg/m². General Appearance: alert and oriented to person, place and time, well-developed and well-nourished, in no acute distress  Neck: neck supple and non tender without mass, no thyromegaly or thyroid nodules, no cervical lymphadenopathy   Cardiovascular: normal rate and normal S1 and S2       Allergies   Allergen Reactions    Peanut-Containing Drug Products      Prior to Visit Medications    Medication Sig Taking? Authorizing Provider   lisinopril (PRINIVIL;ZESTRIL) 20 MG tablet TAKE 1 TABLET BY MOUTH DAILY Yes STEPHY Iraheta CNP   atorvastatin (LIPITOR) 80 MG tablet TAKE 1 TABLET BY MOUTH EVERY NIGHT Yes STEPHY Iraheta CNP   EPINEPHrine (EPIPEN 2-KAMI) 0.3 MG/0.3ML SOAJ injection Inject 0.3 mLs into the muscle once as needed (severe allergic reaction) Inject into lateral thigh muscle.  Yes STEPHY Iraheta CNP   aspirin 81 MG EC tablet Take 1 tablet by mouth daily Yes Jori Pang MD       [facility] (Including outside providers/suppliers regularly involved in providing care):   Patient Care Team:  STEPHY Iraheta CNP as PCP - General (Nurse Practitioner Family)  STEPHY Iraheta CNP as PCP - REHABILITATION [facility] Empaneled Provider    Reviewed and updated this visit:  Tobacco  Allergies  Meds  Problems  Med Hx  Surg Hx  Soc Hx  Fam Hx

## 2022-04-26 NOTE — PATIENT INSTRUCTIONS
Increase lisinopril 20 mg daily  Make appointment urologist  Limit eating sweets- prediabetic  Follow up 2 months      Personalized Preventive Plan for Jackelin Molina - 4/26/2022  Medicare offers a range of preventive health benefits. Some of the tests and screenings are paid in full while other may be subject to a deductible, co-insurance, and/or copay. Some of these benefits include a comprehensive review of your medical history including lifestyle, illnesses that may run in your family, and various assessments and screenings as appropriate. After reviewing your medical record and screening and assessments performed today your provider may have ordered immunizations, labs, imaging, and/or referrals for you. A list of these orders (if applicable) as well as your Preventive Care list are included within your After Visit Summary for your review. Other Preventive Recommendations:    · A preventive eye exam performed by an eye specialist is recommended every 1-2 years to screen for glaucoma; cataracts, macular degeneration, and other eye disorders. · A preventive dental visit is recommended every 6 months. · Try to get at least 150 minutes of exercise per week or 10,000 steps per day on a pedometer . · Order or download the FREE \"Exercise & Physical Activity: Your Everyday Guide\" from [employer]. Call 1-868.740.8160 or search [employer] online. · You need 8807-0402 mg of calcium and 9802-1389 IU of vitamin D per day. It is possible to meet your calcium requirement with diet alone, but a vitamin D supplement is usually necessary to meet this goal.  · When exposed to the sun, use a sunscreen that protects against both UVA and UVB radiation with an SPF of 30 or greater. Reapply every 2 to 3 hours or after sweating, drying off with a towel, or swimming. · Always wear a seat belt when traveling in a car. Always wear a helmet when riding a bicycle or motorcycle.

## 2022-05-11 ENCOUNTER — PATIENT MESSAGE (OUTPATIENT)
Dept: FAMILY MEDICINE CLINIC | Age: 67
End: 2022-05-11

## 2022-05-12 NOTE — TELEPHONE ENCOUNTER
Needs appointment please call him to schedule.  Would be OK for virtual appointment with another provider if available

## 2022-05-12 NOTE — TELEPHONE ENCOUNTER
From: Clare Galvan  To: Yasir Phil  Sent: 5/11/2022 9:17 PM EDT  Subject: Upper respiratory infection     Experiencing cough with some mucus, some pain in left lung when I cough. The coughing is occasional.   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  Started steroids/prednisone but had an allergic reaction (added to file). Could I get a z-france prescription? I tried to submit an e-visit but the response was to contact my medical provider.    Thank you

## 2022-06-20 ENCOUNTER — PATIENT MESSAGE (OUTPATIENT)
Dept: FAMILY MEDICINE CLINIC | Age: 67
End: 2022-06-20

## 2022-06-20 ENCOUNTER — E-VISIT (OUTPATIENT)
Dept: FAMILY MEDICINE CLINIC | Age: 67
End: 2022-06-20
Payer: MEDICARE

## 2022-06-20 DIAGNOSIS — J01.90 ACUTE SINUSITIS, RECURRENCE NOT SPECIFIED, UNSPECIFIED LOCATION: Primary | ICD-10-CM

## 2022-06-20 PROCEDURE — 99421 OL DIG E/M SVC 5-10 MIN: CPT | Performed by: NURSE PRACTITIONER

## 2022-06-20 ASSESSMENT — LIFESTYLE VARIABLES
SMOKING_YEARS: 40
SMOKING_STATUS: YES

## 2022-06-20 NOTE — TELEPHONE ENCOUNTER
4/26/2022    Future Appointments   Date Time Provider Swapna Lynni   6/30/2022  2:40 PM Patrick Ramus, APRN - CNP [facility] [place] [facility] [place]   8/17/2022 11:00 AM STEPHY Quintana CNP [address]

## 2022-06-20 NOTE — TELEPHONE ENCOUNTER
From: Yamileth Woo  To: Aaron Montez  Sent: 6/20/2022 4:41 PM EDT  Subject: Amlodipine Besylate     Amlodipine Besylate 5mg is no longer available to refill. Shahid Buchanan will be out of this medication in a few days. What is the reason for this and why were we not notified?

## 2022-06-21 RX ORDER — AMLODIPINE BESYLATE 5 MG/1
5 TABLET ORAL DAILY
Qty: 90 TABLET | Refills: 1 | Status: SHIPPED | OUTPATIENT
Start: 2022-06-21

## 2022-06-21 RX ORDER — AZITHROMYCIN 250 MG/1
TABLET, FILM COATED ORAL
Qty: 6 TABLET | Refills: 0 | Status: SHIPPED | OUTPATIENT
Start: 2022-06-21 | End: 2022-07-28

## 2022-06-21 NOTE — TELEPHONE ENCOUNTER
Spoke with alex and he stated that yes he is taking both medications. And that he will Check BP tonight . Stated that he is out of medication for Amlodipine.

## 2022-06-21 NOTE — TELEPHONE ENCOUNTER
Please call pt to see what BP medication he is taking.  At his last office visit the amlodipine was reported as not being taken and lisinopril dose was increased- please call pt to see if he has been taking both this lisinopril and amlodipine

## 2022-06-21 NOTE — TELEPHONE ENCOUNTER
Spoke with patient's son, Naomie Potter. They are very confused about  patient's meds and the purposes of them. Lucinda Miller reports that patient is taking amlodipine and lipitor. Patient stopped lipitor in March b/c he thought lisinopril was replacing it. I explained to Naomie Potter that lisinopril is for BP, not cholesterol.     Is the patient supposed to be taking lipitor too??

## 2022-06-21 NOTE — TELEPHONE ENCOUNTER
He should be taking Lipitor (atorvastatin) this is a cholesterol medication and he is on it for risk reduction with history of stroke. The lisinopril and amlodipine are both for blood pressure, has he been taking both? This is OK to take both but if they are able to ask them to check BP if they have a cuff and call back with what BP reading is.

## 2022-06-27 ENCOUNTER — HOSPITAL ENCOUNTER (EMERGENCY)
Age: 67
Discharge: HOME | End: 2022-06-27
Attending: EMERGENCY MEDICINE
Payer: MEDICARE

## 2022-06-27 VITALS
RESPIRATION RATE: 16 BRPM | OXYGEN SATURATION: 96 % | DIASTOLIC BLOOD PRESSURE: 79 MMHG | HEART RATE: 67 BPM | TEMPERATURE: 97.7 F | SYSTOLIC BLOOD PRESSURE: 110 MMHG

## 2022-06-27 DIAGNOSIS — M79.604 RIGHT LEG PAIN: Primary | ICD-10-CM

## 2022-06-27 PROCEDURE — 99283 EMERGENCY DEPT VISIT LOW MDM: CPT

## 2022-06-27 PROCEDURE — 6370000000 HC RX 637 (ALT 250 FOR IP): Performed by: EMERGENCY MEDICINE

## 2022-06-27 RX ADMIN — APIXABAN 10 MG: 5 TABLET, FILM COATED ORAL at 21:48

## 2022-06-27 RX ADMIN — APIXABAN 80 MG: 5 TABLET, FILM COATED ORAL at 21:49

## 2022-06-27 ASSESSMENT — PAIN - FUNCTIONAL ASSESSMENT: PAIN_FUNCTIONAL_ASSESSMENT: NONE - DENIES PAIN

## 2022-06-28 NOTE — ED PROVIDER NOTES
[facility] Emergency Department    CHIEF COMPLAINT  Leg Pain (swelling, redness for a week)      SHARED SERVICE VISIT  I have seen and evaluated this patient with my supervising physician, Dr. Ashley Doshi . HISTORY OF PRESENT ILLNESS  Lennox García is a 74 y.o. male who presents to the ED complaining of right calf swelling and redness for 1 week patient states that is noticed that his right calf/ankle has been swelling and red for the past week. Patient has not taken any medication to alleviate pain. Patient does have a history of lung cancer in 2001. Has no prior history of DVT or PE. Patient does smoke 2 packs a day for several years. 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 Patient denies being on blood thinners currently. Patient denies any recent trauma. Patient denies any headaches, dizziness, or loss of consciousness. Patient denies any chest pain, shortness of breath, or dyspnea on exertion. Patient denies any fever, chills, or body aches. Patient denies any nausea, vomiting, diarrhea, or abdominal pain. Patient denies any urinary symptoms. No other complaints, modifying factors or associated symptoms. Nursing notes reviewed. Past Medical History:   Diagnosis Date    Cancer [facility])     lung ca 2001    Stroke (cerebrum) [facility])      Past Surgical History:   Procedure Laterality Date    APPENDECTOMY      LUNG CANCER SURGERY       No family history on file. Social History     Socioeconomic History    Marital status:       Spouse name: Not on file    Number of children: Not on file    Years of education: Not on file    Highest education level: Not on file   Occupational History    Not on file   Tobacco Use    Smoking status: Current Every Day Smoker     Packs/day: 2.00     Years: 50.00     Pack years: 100.00     Types: Cigarettes    Smokeless tobacco: Never Used   Vaping Use    Vaping Use: Never used   Substance and Sexual Activity    Alcohol use: Yes     Comment: occasionally    Drug use: No    Sexual activity: Not on file   Other Topics Concern    Not on file   Social History Narrative    Not on file     Social Determinants of Health     Financial Resource Strain: Low Risk     Difficulty of Paying Living Expenses: Not hard at all   Food Insecurity: No Food Insecurity    Worried About Running Out of Food in the Last Year: Never true    Natalia of Food in the Last Year: Never true   Transportation Needs: No Transportation Needs    Lack of Transportation (Medical): No    Lack of Transportation (Non-Medical): No   Physical Activity: Sufficiently Active    Days of Exercise per Week: 7 days    Minutes of Exercise per Session: 60 min   Stress:     Feeling of Stress : Not on file   Social Connections:     Frequency of Communication with Friends and Family: Not on file    Frequency of Social Gatherings with Friends and Family: Not on file    Attends Christian Services: Not on file    Active Member of Clubs or Organizations: Not on file    Attends Club or Organization Meetings: Not on file    Marital Status: Not on file   Intimate Partner Violence:     Fear of Current or Ex-Partner: Not on file    Emotionally Abused: Not on file    Physically Abused: Not on file    Sexually Abused: Not on file   Housing Stability: Unknown    Unable to Pay for Housing in the Last Year: No    Number of [place] in the Last Year: Not on file    Unstable Housing in the Last Year: No     No current facility-administered medications for this encounter.      Current Outpatient Medications   Medication Sig Dispense Refill    azithromycin (ZITHROMAX) 250 MG tablet 500mg on day 1 followed by 250mg on days 2 - 5 6 tablet 0    amLODIPine (NORVASC) 5 MG tablet Take 1 tablet by mouth daily 90 tablet 1    lisinopril (PRINIVIL;ZESTRIL) 20 MG tablet TAKE 1 TABLET BY MOUTH DAILY 90 tablet 0    atorvastatin (LIPITOR) 80 MG tablet TAKE 1 TABLET BY MOUTH EVERY NIGHT 90 tablet 0    EPINEPHrine (EPIPEN 2-KAMI) 0.3 MG/0.3ML SOAJ injection Inject 0.3 mLs into the muscle once as needed (severe allergic reaction) Inject into lateral thigh muscle. 1 each 1    aspirin 81 MG EC tablet Take 1 tablet by mouth daily 30 tablet 1     Allergies   Allergen Reactions    Peanut-Containing Drug Products     Prednisone Hives and Itching       REVIEW OF SYSTEMS  10 systems reviewed, pertinent positives per HPI otherwise noted to be negative    PHYSICAL EXAM  /80   Pulse 71   Temp 97.7 °F (36.5 °C) (Oral)   Resp 16   SpO2 98%   GENERAL APPEARANCE: Awake and alert. Cooperative. No acute distress. HEAD: Normocephalic. Atraumatic. EYES: PERRL. EOM's grossly intact. ENT: Mucous membranes are pink and moist.   NECK: Supple. HEART: RRR. No murmurs. LUNGS: Wheezing noted in all lung fields bilaterally. Respirations unlabored. CTAB. Good air exchange. Speaking comfortably in full sentences. ABDOMEN: Soft. Non-distended. Non-tender. No guarding or rebound. No masses. No organomegaly. EXTREMITIES: 2+ pitting edema noted on the right lower extremity. Tenderness to palpation of the right ankle. There is erythema noted of the right ankle. Positive Homans' sign. No warm to touch. Moves all extremities equally. All extremities neurovascularly intact. SKIN: Warm and dry. No acute rashes. NEUROLOGICAL: Alert and oriented. CN's 2-12 intact. No gross facial drooping. Strength 5/5, sensation intact. PSYCHIATRIC: Normal mood and affect. RADIOLOGY  No results found. ED COURSE  39-year-old male complaining of lower right calf swelling and pain for 1 week. He does have a history of lung cancer as well as a child which she states 6 to 8 hours a day. Denies any trauma to the limb. Tenderness of the ankle as well as swelling and redness noted on physical exam.  2+ pitting edema of the right ankle.   Discussed this case with my attending Dr. Jenny Tarango with suggestion of empirically treating for DVT with outpatient venous Doppler follow-up within 24 hours. Triage vitals BP was 106/80, pulse was 71, respirations 16, temperature 97.7 °F, SPO2 90% on room air. Risk management discussed and shared decision making had with patient and/or surrogate. All questions were answered. Outpatient follow-up ordered for venous Doppler ultrasound of the right leg for evaluation of the DVT. Patient will be started on Eliquis prophylactically. Patient will follow up with primary care provider for further evaluation/treatment. All questions answered. Patient will return to ED for new/worsening symptoms. Patient was sent home with a prescription for Eliquis. CRITICAL CARE TIME  0 minutes of critical care time spent not including separately billable procedures. MDM  No results found for this visit on 06/27/22. I estimate there is a HIGH risk for a DEEP VENOUS THROMBOSIS, however I consider the discharge disposition reasonable. I estimate there is LOW risk for COMPARTMENT SYNDROME, SEPTIC ARTHRITIS, TENDON OR NEUROVASCULAR INJURY, thus I consider the discharge disposition reasonable. Lennox Tee and I have discussed the diagnosis and risks, and we agree with discharging home to follow-up with their primary doctor or the referral orthopedist. We also discussed returning to the Emergency Department immediately if new or worsening symptoms occur. We have discussed the symptoms which are most concerning (e.g., changing or worsening pain, numbness, weakness) that necessitate immediate return. Final Impression    1. Right leg pain        Blood pressure 110/79, pulse 67, temperature 97.7 °F (36.5 °C), temperature source Oral, resp. rate 16, SpO2 96 %. DISPOSITION  Patient was discharged to home in good condition.          Michael Pierce PA-C  06/28/22 1930

## 2022-06-28 NOTE — ED NOTES
Pt instructed to have out US of right lower leg. Pt instructed to follow up with PCP. Assessed per Dr Russel Barry.      Fabiola Spaulding, IRWIN  34/34/54 [id number]

## 2022-06-28 NOTE — ED PROVIDER NOTES
I independently performed a history and physical on "[employer]. All diagnostic, treatment, and disposition decisions were made by myself in conjunction with the advanced practice provider. For further details of Lennox Dubois's emergency department encounter, please see KIKE Valentin's documentation. Patient reports that his right leg has been larger than the left for least a week and he called his doctor and was told to come to the ER for evaluation. Patient denies any chest pain or any worsening shortness of breath. He denies also any injury to that leg or any prior history of DVT or PE. On exam right lower extremity is slightly erythematous and edematous and is definitely larger than the left below the knee. There is no tenderness or edema or erythema above the knee. I personally saw this patient and performed a substantive portion of the visit including all aspects of the medical decision making. MDM  Advised patient that we cannot obtain vascular ultrasound at this time today at this facility and we will treat him presumptively with Eliquis and discharge for outpatient ultrasound tomorrow. Patient agrees with plan. Advised return immediately for any chest pain or shortness of breath    I personally saw this patient and independently provided 10 minutes of non-concurrent critical care out of the total shared critical care time provided.        Jenny Tarango MD  06/28/22 [id number]

## 2022-07-01 ENCOUNTER — HOSPITAL ENCOUNTER (OUTPATIENT)
Dept: VASCULAR LAB | Age: 67
Discharge: HOME | End: 2022-07-01
Payer: MEDICARE

## 2022-07-01 DIAGNOSIS — M79.604 RIGHT LEG PAIN: ICD-10-CM

## 2022-07-01 PROCEDURE — 93971 EXTREMITY STUDY: CPT

## 2022-07-28 ENCOUNTER — OFFICE VISIT (OUTPATIENT)
Dept: FAMILY MEDICINE CLINIC | Age: 67
End: 2022-07-28
Payer: MEDICARE

## 2022-07-28 VITALS
DIASTOLIC BLOOD PRESSURE: 78 MMHG | OXYGEN SATURATION: 99 % | TEMPERATURE: 97.1 F | RESPIRATION RATE: 16 BRPM | WEIGHT: 226 LBS | HEART RATE: 71 BPM | SYSTOLIC BLOOD PRESSURE: 128 MMHG | BODY MASS INDEX: 29.02 KG/M2

## 2022-07-28 DIAGNOSIS — M79.89 RIGHT LEG SWELLING: Primary | ICD-10-CM

## 2022-07-28 DIAGNOSIS — I10 PRIMARY HYPERTENSION: ICD-10-CM

## 2022-07-28 DIAGNOSIS — K62.5 BLOOD PER RECTUM: ICD-10-CM

## 2022-07-28 DIAGNOSIS — Z12.11 COLON CANCER SCREENING: ICD-10-CM

## 2022-07-28 DIAGNOSIS — I87.2 VENOUS STASIS DERMATITIS OF RIGHT LOWER EXTREMITY: ICD-10-CM

## 2022-07-28 PROCEDURE — G8427 DOCREV CUR MEDS BY ELIG CLIN: HCPCS | Performed by: NURSE PRACTITIONER

## 2022-07-28 PROCEDURE — 4004F PT TOBACCO SCREEN RCVD TLK: CPT | Performed by: NURSE PRACTITIONER

## 2022-07-28 PROCEDURE — 99214 OFFICE O/P EST MOD 30 MIN: CPT | Performed by: NURSE PRACTITIONER

## 2022-07-28 PROCEDURE — 3017F COLORECTAL CA SCREEN DOC REV: CPT | Performed by: NURSE PRACTITIONER

## 2022-07-28 PROCEDURE — G8417 CALC BMI ABV UP PARAM F/U: HCPCS | Performed by: NURSE PRACTITIONER

## 2022-07-28 PROCEDURE — 1123F ACP DISCUSS/DSCN MKR DOCD: CPT | Performed by: NURSE PRACTITIONER

## 2022-07-28 RX ORDER — TRIAMCINOLONE ACETONIDE 1 MG/G
CREAM TOPICAL
Qty: 45 G | Refills: 0 | Status: SHIPPED | OUTPATIENT
Start: 2022-07-28

## 2022-07-28 ASSESSMENT — ANXIETY QUESTIONNAIRES
2. NOT BEING ABLE TO STOP OR CONTROL WORRYING: 0
IF YOU CHECKED OFF ANY PROBLEMS ON THIS QUESTIONNAIRE, HOW DIFFICULT HAVE THESE PROBLEMS MADE IT FOR YOU TO DO YOUR WORK, TAKE CARE OF THINGS AT HOME, OR GET ALONG WITH OTHER PEOPLE: NOT DIFFICULT AT ALL
3. WORRYING TOO MUCH ABOUT DIFFERENT THINGS: 0
6. BECOMING EASILY ANNOYED OR IRRITABLE: 0
7. FEELING AFRAID AS IF SOMETHING AWFUL MIGHT HAPPEN: 0
GAD7 TOTAL SCORE: 0
5. BEING SO RESTLESS THAT IT IS HARD TO SIT STILL: 0
4. TROUBLE RELAXING: 0
1. FEELING NERVOUS, ANXIOUS, OR ON EDGE: 0

## 2022-07-28 ASSESSMENT — PATIENT HEALTH QUESTIONNAIRE - PHQ9
SUM OF ALL RESPONSES TO PHQ QUESTIONS 1-9: 0
SUM OF ALL RESPONSES TO PHQ9 QUESTIONS 1 & 2: 0
SUM OF ALL RESPONSES TO PHQ QUESTIONS 1-9: 0
SUM OF ALL RESPONSES TO PHQ QUESTIONS 1-9: 0
1. LITTLE INTEREST OR PLEASURE IN DOING THINGS: 0
SUM OF ALL RESPONSES TO PHQ QUESTIONS 1-9: 0
DEPRESSION UNABLE TO ASSESS: FUNCTIONAL CAPACITY MOTIVATION LIMITS ACCURACY
2. FEELING DOWN, DEPRESSED OR HOPELESS: 0

## 2022-07-28 ASSESSMENT — ENCOUNTER SYMPTOMS
SHORTNESS OF BREATH: 0
COLOR CHANGE: 1
CONSTIPATION: 1
DIARRHEA: 0
VOMITING: 0
NAUSEA: 0
COUGH: 1

## 2022-07-28 NOTE — PATIENT INSTRUCTIONS
Blood pressure is controlled continue current medications  Continue to wear compression stockings  Keep leg elevated  Steroid cream twice daily to the dry dark skin on the right leg  Keep skin well moisturize on the legs with lotion nightly  Follow up as needed on the legs for any new or worsening of symptoms    [employer]- will measure you for compression stockings

## 2022-07-28 NOTE — PROGRESS NOTES
2022     Chief Complaint   Patient presents with    Leg Swelling     Right leg Swelling     Rectal Bleeding     Beginning of this week would like colonoscopy        Ray SARAH Olivo (:  1955) is a 74 y.o. male, here for evaluation of the following medical concerns:    HPI    Leg swelling: Was seen in the emergency department on 2022 for right leg swelling. He had a Doppler which was negative for DVT. His swelling has improved he has been wearing compression stockings. The redness of the skin is now marked all, reports he has had the redness in the skin since last fall when he slipped on a muddy step and hit his shin. Sometimes the skin is pruritic. Denies any calf pain or chest pain. His blood pressure is well controlled, he is taking amlodipine 5 mg daily and lisinopril 20 mg daily. He did have some rectal bleeding with wiping after bowel movement earlier in the week. No abdominal pain, no dizziness no melena no diarrhea. Has never had colonoscopy and referral was placed today. Review of Systems   Constitutional:  Negative for fatigue and fever. HENT:  Negative for congestion. Respiratory:  Positive for cough (chronic). Negative for shortness of breath. Cardiovascular:  Positive for leg swelling. Negative for chest pain. Gastrointestinal:  Positive for constipation. Negative for diarrhea, nausea and vomiting. Skin:  Positive for color change (dark red skin, right lower anterior leg). Neurological:  Negative for dizziness and headaches. Prior to Visit Medications    Medication Sig Taking? Authorizing Provider   triamcinolone (KENALOG) 0.1 % cream Apply topically 2 times daily.  Yes STEPHY Whitt CNP   amLODIPine (NORVASC) 5 MG tablet Take 1 tablet by mouth daily  STEPHY Whitt CNP   lisinopril (PRINIVIL;ZESTRIL) 20 MG tablet TAKE 1 TABLET BY MOUTH DAILY  STEPHY Whitt - CNP   atorvastatin (LIPITOR) 80 MG tablet TAKE 1 TABLET BY MOUTH EVERY NIGHT  STEPHY Frederick CNP   EPINEPHrine (EPIPEN 2-KAMI) 0.3 MG/0.3ML SOAJ injection Inject 0.3 mLs into the muscle once as needed (severe allergic reaction) Inject into lateral thigh muscle. STEPHY Frederick CNP   aspirin 81 MG EC tablet Take 1 tablet by mouth daily  Hannah Werner MD        Social History     Tobacco Use    Smoking status: Every Day     Packs/day: 2.00     Years: 50.00     Pack years: 100.00     Types: Cigarettes    Smokeless tobacco: Never   Substance Use Topics    Alcohol use: Yes     Comment: occasionally        Vitals:    22 1112   BP: 128/78   Site: Left Upper Arm   Position: Sitting   Pulse: 71   Resp: 16   Temp: 97.1 °F (36.2 °C)   SpO2: 99%   Weight: 226 lb (102.5 kg)     Estimated body mass index is 29.02 kg/m² as calculated from the following:    Height as of 22: 6' 2\" (1.88 m). Weight as of this encounter: 226 lb (102.5 kg). Physical Exam  Vitals and nursing note reviewed. Constitutional:       General: He is not in acute distress. Appearance: Normal appearance. He is well-developed. He is obese. He is not ill-appearing, toxic-appearing or diaphoretic. HENT:      Head: Normocephalic and atraumatic. Eyes:      Conjunctiva/sclera: Conjunctivae normal.      Pupils: Pupils are equal, round, and reactive to light. Cardiovascular:      Rate and Rhythm: Normal rate and regular rhythm. Heart sounds: Normal heart sounds. No murmur heard. No friction rub. No gallop. Pulmonary:      Effort: Pulmonary effort is normal. No respiratory distress. Breath sounds: Normal breath sounds. No stridor. No wheezing, rhonchi or rales. Musculoskeletal:      Right lower le+ Edema present. Skin:            Comments: Dull red, dry skin patches. No painful. Neurological:      General: No focal deficit present. Mental Status: He is alert and oriented to person, place, and time. Mental status is at baseline. Cranial Nerves: No cranial nerve deficit. ASSESSMENT/PLAN:  1. Right leg swelling-some improvement, continue compression stockings and leg elevation    2. Venous stasis dermatitis of right lower extremity-start triamcinolone cream twice daily for 2 weeks, discussed skin maintenance with regular moisturizing  - triamcinolone (KENALOG) 0.1 % cream; Apply topically 2 times daily. Dispense: 45 g; Refill: 0    3. Primary hypertension-controlled continue amlodipine and lisinopril    4. Colon cancer screening  - TOMMY - René Meza MD, Gastroenterology, [facility]    5. Blood per rectum- with wiping, referred to GI for colonoscopy  - TOMMY - René Meza MD, Gastroenterology, [facility]    Return in about 4 months (around 11/28/2022) for Hypertension. An electronic signature was used to authenticate this note.     --Madan Piper, STEPHY - CNP on 7/28/2022 at 11:58 AM

## 2022-07-29 RX ORDER — LISINOPRIL 20 MG/1
TABLET ORAL
Qty: 90 TABLET | Refills: 3 | Status: SHIPPED | OUTPATIENT
Start: 2022-07-29

## 2022-07-29 NOTE — TELEPHONE ENCOUNTER
LOV 7/28/2022    Future Appointments   Date Time Provider Swapna Rodriguez   11/18/2022 11:00 AM STEPHY Mcqueen - CNP [facility] [place] - [place]

## 2022-09-19 RX ORDER — ATORVASTATIN CALCIUM 80 MG/1
TABLET, FILM COATED ORAL
Qty: 90 TABLET | Refills: 0 | Status: SHIPPED | OUTPATIENT
Start: 2022-09-19

## 2022-10-30 ENCOUNTER — TELEPHONE (OUTPATIENT)
Dept: CASE MANAGEMENT | Age: 67
End: 2022-10-30

## 2022-10-30 DIAGNOSIS — F17.210 CIGARETTE SMOKER: Primary | ICD-10-CM

## 2022-10-30 NOTE — TELEPHONE ENCOUNTER
Patient due for annual CT Lung screening. If you would like patient to have screening, please place order for CT Lung screening ([facility] [address]). Patient due for annual CT Lung Screening. Reminder letter mailed.     [address] Lung Navigator  [phone]

## 2022-11-27 ENCOUNTER — TELEPHONE (OUTPATIENT)
Dept: CASE MANAGEMENT | Age: 67
End: 2022-11-27

## 2022-11-27 NOTE — TELEPHONE ENCOUNTER
Patient due for annual CT Lung Screening. Reminder letter mailed.     [address] Lung Navigator  [phone]

## 2022-12-01 ENCOUNTER — OFFICE VISIT (OUTPATIENT)
Dept: FAMILY MEDICINE CLINIC | Age: 67
End: 2022-12-01
Payer: MEDICARE

## 2022-12-01 VITALS
TEMPERATURE: 97.1 F | BODY MASS INDEX: 28.89 KG/M2 | SYSTOLIC BLOOD PRESSURE: 132 MMHG | HEART RATE: 68 BPM | WEIGHT: 225 LBS | RESPIRATION RATE: 16 BRPM | OXYGEN SATURATION: 98 % | DIASTOLIC BLOOD PRESSURE: 78 MMHG

## 2022-12-01 DIAGNOSIS — R97.20 ELEVATED PSA, BETWEEN 10 AND LESS THAN 20 NG/ML: ICD-10-CM

## 2022-12-01 DIAGNOSIS — R73.03 PREDIABETES: ICD-10-CM

## 2022-12-01 DIAGNOSIS — I10 PRIMARY HYPERTENSION: Primary | ICD-10-CM

## 2022-12-01 DIAGNOSIS — Z72.0 TOBACCO ABUSE: ICD-10-CM

## 2022-12-01 DIAGNOSIS — E78.00 HYPERCHOLESTEREMIA: ICD-10-CM

## 2022-12-01 PROCEDURE — 1123F ACP DISCUSS/DSCN MKR DOCD: CPT | Performed by: NURSE PRACTITIONER

## 2022-12-01 PROCEDURE — 99214 OFFICE O/P EST MOD 30 MIN: CPT | Performed by: NURSE PRACTITIONER

## 2022-12-01 PROCEDURE — 3074F SYST BP LT 130 MM HG: CPT | Performed by: NURSE PRACTITIONER

## 2022-12-01 PROCEDURE — 4004F PT TOBACCO SCREEN RCVD TLK: CPT | Performed by: NURSE PRACTITIONER

## 2022-12-01 PROCEDURE — G8417 CALC BMI ABV UP PARAM F/U: HCPCS | Performed by: NURSE PRACTITIONER

## 2022-12-01 PROCEDURE — G8427 DOCREV CUR MEDS BY ELIG CLIN: HCPCS | Performed by: NURSE PRACTITIONER

## 2022-12-01 PROCEDURE — G8484 FLU IMMUNIZE NO ADMIN: HCPCS | Performed by: NURSE PRACTITIONER

## 2022-12-01 PROCEDURE — 3078F DIAST BP <80 MM HG: CPT | Performed by: NURSE PRACTITIONER

## 2022-12-01 PROCEDURE — 3017F COLORECTAL CA SCREEN DOC REV: CPT | Performed by: NURSE PRACTITIONER

## 2022-12-01 ASSESSMENT — ANXIETY QUESTIONNAIRES
7. FEELING AFRAID AS IF SOMETHING AWFUL MIGHT HAPPEN: 0
3. WORRYING TOO MUCH ABOUT DIFFERENT THINGS: 0
6. BECOMING EASILY ANNOYED OR IRRITABLE: 0
1. FEELING NERVOUS, ANXIOUS, OR ON EDGE: 0
GAD7 TOTAL SCORE: 0
2. NOT BEING ABLE TO STOP OR CONTROL WORRYING: 0
IF YOU CHECKED OFF ANY PROBLEMS ON THIS QUESTIONNAIRE, HOW DIFFICULT HAVE THESE PROBLEMS MADE IT FOR YOU TO DO YOUR WORK, TAKE CARE OF THINGS AT HOME, OR GET ALONG WITH OTHER PEOPLE: NOT DIFFICULT AT ALL
4. TROUBLE RELAXING: 0
5. BEING SO RESTLESS THAT IT IS HARD TO SIT STILL: 0

## 2022-12-01 ASSESSMENT — ENCOUNTER SYMPTOMS
COLOR CHANGE: 1
DIARRHEA: 0
VOMITING: 0
SHORTNESS OF BREATH: 0
CONSTIPATION: 1
NAUSEA: 0
COUGH: 1

## 2022-12-01 ASSESSMENT — PATIENT HEALTH QUESTIONNAIRE - PHQ9
1. LITTLE INTEREST OR PLEASURE IN DOING THINGS: 0
SUM OF ALL RESPONSES TO PHQ QUESTIONS 1-9: 0
2. FEELING DOWN, DEPRESSED OR HOPELESS: 0
SUM OF ALL RESPONSES TO PHQ QUESTIONS 1-9: 0
SUM OF ALL RESPONSES TO PHQ9 QUESTIONS 1 & 2: 0
SUM OF ALL RESPONSES TO PHQ QUESTIONS 1-9: 0
SUM OF ALL RESPONSES TO PHQ QUESTIONS 1-9: 0
DEPRESSION UNABLE TO ASSESS: FUNCTIONAL CAPACITY MOTIVATION LIMITS ACCURACY

## 2022-12-01 NOTE — PATIENT INSTRUCTIONS
- make an appointment with the urologist for the elevated PSA    [facility]   [address] [facility], [address] [facility], [facility]   Phone: [phone]   Fax: [phone]    - Schedule your CT scan of the lungs [phone]    - continue current medications    - Prep H is OK to use     - follow up in May 2nd at 10 am

## 2022-12-01 NOTE — PROGRESS NOTES
2022     Chief Complaint   Patient presents with    Medication Check       Ray Tata Lund (:  1955) is a 69 y.o. male, here for evaluation of the following medical concerns:    HPI    Hypertension:  Home blood pressure monitoring: No. Patient denies chest pain and shortness of breath. Antihypertensive medication side effects: no medication side effects noted. Use of agents associated with hypertension: none. Sodium (mmol/L)   Date Value   2022 132 (L)    BUN (mg/dL)   Date Value   2022 11    Glucose (mg/dL)   Date Value   2022 119 (H)      Potassium (mmol/L)   Date Value   2022 4.7     Potassium reflex Magnesium (mmol/L)   Date Value   10/20/2020 3.8    Creatinine (mg/dL)   Date Value   2022 0.7 (L)         Hyperlipidemia:  No new myalgias or GI upset on atorvastatin (Lipitor). Lab Results   Component Value Date    CHOL 96 2022    TRIG 57 2022    HDL 45 2022    LDLCALC 40 2022     Lab Results   Component Value Date    ALT 20 2022    AST 20 2022        Has hemorrhoids, did have colonoscopy in August and that showed internal hemorrhoids. Bleeding has improved since he switched diet. He is using preparation H prn. He is unsure if he saw urology for elevated PSA, he will check with his son and is agreeable to follow up if they have not. Denies urinary symptoms    Due for LDCT scan- will schedule  AAA screening due- will schedule    Decline flu and pneumonia vaccines    Continues to smoke 2 PPD    Review of Systems   Constitutional:  Negative for fatigue and fever. HENT:  Negative for congestion. Respiratory:  Positive for cough (chronic). Negative for shortness of breath. Cardiovascular:  Positive for leg swelling (improved with compression). Negative for chest pain. Gastrointestinal:  Positive for constipation (improved with dietary changes).  Negative for diarrhea, nausea and vomiting. Skin:  Positive for color change (dark red skin, right lower anterior leg). Neurological:  Negative for dizziness and headaches. Prior to Visit Medications    Medication Sig Taking? Authorizing Provider   atorvastatin (LIPITOR) 80 MG tablet TAKE 1 TABLET BY MOUTH EVERY NIGHT Yes STEPHY Eng CNP   lisinopril (PRINIVIL;ZESTRIL) 20 MG tablet TAKE 1 TABLET BY MOUTH DAILY Yes STEPHY Eng - CNP   triamcinolone (KENALOG) 0.1 % cream Apply topically 2 times daily. Yes STEPHY Eng - CNP   amLODIPine (NORVASC) 5 MG tablet Take 1 tablet by mouth daily Yes Jacquie Farnsworth APRN - CNP   aspirin 81 MG EC tablet Take 1 tablet by mouth daily Yes Ori Ritchie MD   EPINEPHrine (EPIPEN 2-KAMI) 0.3 MG/0.3ML SOAJ injection Inject 0.3 mLs into the muscle once as needed (severe allergic reaction) Inject into lateral thigh muscle. STEPHY Eng CNP        Social History     Tobacco Use    Smoking status: Every Day     Packs/day: 2.00     Years: 50.00     Pack years: 100.00     Types: Cigarettes    Smokeless tobacco: Never   Substance Use Topics    Alcohol use: Yes     Comment: occasionally        Vitals:    12/01/22 1049   BP: 132/78   Site: Left Upper Arm   Position: Sitting   Pulse: 68   Resp: 16   Temp: 97.1 °F (36.2 °C)   TempSrc: Temporal   SpO2: 98%   Weight: 225 lb (102.1 kg)     Estimated body mass index is 28.89 kg/m² as calculated from the following:    Height as of 4/26/22: 6' 2\" (1.88 m). Weight as of this encounter: 225 lb (102.1 kg). Physical Exam  Vitals and nursing note reviewed. Constitutional:       General: He is not in acute distress. Appearance: Normal appearance. He is well-developed. He is obese. He is not ill-appearing, toxic-appearing or diaphoretic. HENT:      Head: Normocephalic and atraumatic. Eyes:      Extraocular Movements: Extraocular movements intact.       Conjunctiva/sclera: Conjunctivae normal. Pupils: Pupils are equal, round, and reactive to light. Cardiovascular:      Rate and Rhythm: Normal rate and regular rhythm. Heart sounds: Normal heart sounds. No murmur heard. No friction rub. No gallop. Pulmonary:      Effort: Pulmonary effort is normal. No respiratory distress. Breath sounds: No stridor. Rhonchi present. No wheezing or rales. Neurological:      General: No focal deficit present. Mental Status: He is alert and oriented to person, place, and time. Mental status is at baseline. Cranial Nerves: No cranial nerve deficit. ASSESSMENT/PLAN:  1. Primary hypertension- controlled, continue lisinopril  - [place] AAA; Future    2. Tobacco abuse- not ready to quit  - [facility] AAA; Future    3. Prediabetes    4. Elevated PSA, between 10 and less than 20 ng/ml- will follow up with urology    5. Hypercholesteremia- controlled, continue Lipitor    Return in about 6 months (around 6/1/2023). An electronic signature was used to authenticate this note.     --Lance Gallardo, APRN - CNP on 12/1/2022 at 12:23 PM

## 2022-12-09 ENCOUNTER — HOSPITAL ENCOUNTER (OUTPATIENT)
Dept: CT IMAGING | Age: 67
Discharge: HOME | End: 2022-12-09
Payer: MEDICARE

## 2022-12-09 DIAGNOSIS — F17.210 CIGARETTE SMOKER: ICD-10-CM

## 2022-12-09 PROCEDURE — 71271 CT THORAX LUNG CANCER SCR C-: CPT

## 2022-12-16 RX ORDER — AMLODIPINE BESYLATE 5 MG/1
5 TABLET ORAL DAILY
Qty: 90 TABLET | Refills: 1 | Status: SHIPPED | OUTPATIENT
Start: 2022-12-16

## 2022-12-16 RX ORDER — ATORVASTATIN CALCIUM 80 MG/1
TABLET, FILM COATED ORAL
Qty: 90 TABLET | Refills: 0 | Status: SHIPPED | OUTPATIENT
Start: 2022-12-16

## 2022-12-16 NOTE — TELEPHONE ENCOUNTER
LOV 12/1/2022    Future Appointments   Date Time Provider Swapna Rodriguez   5/2/2023 10:00 AM STEPHY Borja - CNP [place] [address] [facility]

## 2023-02-06 ENCOUNTER — E-VISIT (OUTPATIENT)
Dept: FAMILY MEDICINE CLINIC | Age: 68
End: 2023-02-06
Payer: MEDICARE

## 2023-02-06 DIAGNOSIS — R21 RASH: Primary | ICD-10-CM

## 2023-02-06 PROCEDURE — 99421 OL DIG E/M SVC 5-10 MIN: CPT | Performed by: NURSE PRACTITIONER

## 2023-02-07 NOTE — PROGRESS NOTES
Rash x 3 days, intermittently pruritic.  He will use Kenalog cream BID x 7 days and follow up as needed

## 2023-03-17 RX ORDER — ATORVASTATIN CALCIUM 80 MG/1
TABLET, FILM COATED ORAL
Qty: 90 TABLET | Refills: 0 | Status: SHIPPED | OUTPATIENT
Start: 2023-03-17

## 2023-03-17 NOTE — TELEPHONE ENCOUNTER
Xuan Eastman 12/01/2022   Future Appointments   Date Time Provider Swapna Rodriguez   5/2/2023 10:00 AM STEPHY More - CNP [place] [address] [facility]

## 2023-04-29 ENCOUNTER — PATIENT MESSAGE (OUTPATIENT)
Dept: FAMILY MEDICINE CLINIC | Age: 68
End: 2023-04-29

## 2023-05-01 RX ORDER — LISINOPRIL 20 MG/1
20 TABLET ORAL DAILY
Qty: 90 TABLET | Refills: 3 | Status: SHIPPED | OUTPATIENT
Start: 2023-05-01

## 2023-05-01 NOTE — TELEPHONE ENCOUNTER
From: Maximilian Garcia  To: Chaz Irwin  Sent: 4/29/2023 11:32 AM EDT  Subject: [facility] home delivery. I have put in a request for [facility] for all prescriptions. Lisinopril is ready to be refilled now. You should have a prescription refill request from [facility].    Thank you

## 2023-06-15 RX ORDER — AMLODIPINE BESYLATE 5 MG/1
5 TABLET ORAL DAILY
Qty: 90 TABLET | Refills: 1 | Status: SHIPPED | OUTPATIENT
Start: 2023-06-15

## 2023-08-03 SDOH — HEALTH STABILITY: PHYSICAL HEALTH
ON AVERAGE, HOW MANY DAYS PER WEEK DO YOU ENGAGE IN MODERATE TO STRENUOUS EXERCISE (LIKE A BRISK WALK)?: PATIENT DECLINED

## 2023-08-03 SDOH — ECONOMIC STABILITY: INCOME INSECURITY: HOW HARD IS IT FOR YOU TO PAY FOR THE VERY BASICS LIKE FOOD, HOUSING, MEDICAL CARE, AND HEATING?: NOT HARD AT ALL

## 2023-08-03 SDOH — ECONOMIC STABILITY: FOOD INSECURITY: WITHIN THE PAST 12 MONTHS, YOU WORRIED THAT YOUR FOOD WOULD RUN OUT BEFORE YOU GOT MONEY TO BUY MORE.: NEVER TRUE

## 2023-08-03 SDOH — ECONOMIC STABILITY: FOOD INSECURITY: WITHIN THE PAST 12 MONTHS, THE FOOD YOU BOUGHT JUST DIDN'T LAST AND YOU DIDN'T HAVE MONEY TO GET MORE.: NEVER TRUE

## 2023-08-03 ASSESSMENT — LIFESTYLE VARIABLES
HOW OFTEN DO YOU HAVE A DRINK CONTAINING ALCOHOL: PATIENT DECLINED
HOW MANY STANDARD DRINKS CONTAINING ALCOHOL DO YOU HAVE ON A TYPICAL DAY: 98
HOW MANY STANDARD DRINKS CONTAINING ALCOHOL DO YOU HAVE ON A TYPICAL DAY: PATIENT DECLINED
HOW OFTEN DO YOU HAVE A DRINK CONTAINING ALCOHOL: 98
HOW OFTEN DO YOU HAVE SIX OR MORE DRINKS ON ONE OCCASION: 98

## 2023-08-03 ASSESSMENT — PATIENT HEALTH QUESTIONNAIRE - PHQ9
1. LITTLE INTEREST OR PLEASURE IN DOING THINGS: 0
SUM OF ALL RESPONSES TO PHQ9 QUESTIONS 1 & 2: 0
SUM OF ALL RESPONSES TO PHQ QUESTIONS 1-9: 0
2. FEELING DOWN, DEPRESSED OR HOPELESS: 0
SUM OF ALL RESPONSES TO PHQ QUESTIONS 1-9: 0
DEPRESSION UNABLE TO ASSESS: FUNCTIONAL CAPACITY MOTIVATION LIMITS ACCURACY

## 2023-08-04 ENCOUNTER — OFFICE VISIT (OUTPATIENT)
Dept: FAMILY MEDICINE CLINIC | Age: 68
End: 2023-08-04
Payer: MEDICARE

## 2023-08-04 VITALS
RESPIRATION RATE: 16 BRPM | SYSTOLIC BLOOD PRESSURE: 121 MMHG | DIASTOLIC BLOOD PRESSURE: 74 MMHG | HEART RATE: 74 BPM | BODY MASS INDEX: 28.12 KG/M2 | WEIGHT: 219 LBS | OXYGEN SATURATION: 99 % | TEMPERATURE: 97.3 F

## 2023-08-04 DIAGNOSIS — I10 PRIMARY HYPERTENSION: ICD-10-CM

## 2023-08-04 DIAGNOSIS — C34.90 MALIGNANT NEOPLASM OF LUNG, UNSPECIFIED LATERALITY, UNSPECIFIED PART OF LUNG (HCC): ICD-10-CM

## 2023-08-04 DIAGNOSIS — R73.03 PREDIABETES: ICD-10-CM

## 2023-08-04 DIAGNOSIS — Z00.00 MEDICARE ANNUAL WELLNESS VISIT, SUBSEQUENT: Primary | ICD-10-CM

## 2023-08-04 DIAGNOSIS — J43.9 PULMONARY EMPHYSEMA, UNSPECIFIED EMPHYSEMA TYPE (HCC): ICD-10-CM

## 2023-08-04 DIAGNOSIS — E78.5 DYSLIPIDEMIA: ICD-10-CM

## 2023-08-04 DIAGNOSIS — Z12.5 PROSTATE CANCER SCREENING: ICD-10-CM

## 2023-08-04 LAB
ALBUMIN SERPL-MCNC: 4.5 G/DL (ref 3.4–5)
ALBUMIN/GLOB SERPL: 1.9 {RATIO} (ref 1.1–2.2)
ALP SERPL-CCNC: 84 U/L (ref 40–129)
ALT SERPL-CCNC: 17 U/L (ref 10–40)
ANION GAP SERPL CALCULATED.3IONS-SCNC: 11 MMOL/L (ref 3–16)
AST SERPL-CCNC: 18 U/L (ref 15–37)
BILIRUB SERPL-MCNC: 0.5 MG/DL (ref 0–1)
BUN SERPL-MCNC: 9 MG/DL (ref 7–20)
CALCIUM SERPL-MCNC: 9.6 MG/DL (ref 8.3–10.6)
CHLORIDE SERPL-SCNC: 96 MMOL/L (ref 99–110)
CHOLEST SERPL-MCNC: 65 MG/DL (ref 0–199)
CO2 SERPL-SCNC: 27 MMOL/L (ref 21–32)
CREAT SERPL-MCNC: 0.7 MG/DL (ref 0.8–1.3)
DEPRECATED RDW RBC AUTO: 14.8 % (ref 12.4–15.4)
GFR SERPLBLD CREATININE-BSD FMLA CKD-EPI: >60 ML/MIN/{1.73_M2}
GLUCOSE SERPL-MCNC: 125 MG/DL (ref 70–99)
HCT VFR BLD AUTO: 43.8 % (ref 40.5–52.5)
HDLC SERPL-MCNC: 38 MG/DL (ref 40–60)
HGB BLD-MCNC: 14.9 G/DL (ref 13.5–17.5)
LDL CHOLESTEROL CALCULATED: 20 MG/DL
MCH RBC QN AUTO: 30.8 PG (ref 26–34)
MCHC RBC AUTO-ENTMCNC: 33.9 G/DL (ref 31–36)
MCV RBC AUTO: 91 FL (ref 80–100)
PLATELET # BLD AUTO: 274 K/UL (ref 135–450)
PMV BLD AUTO: 7 FL (ref 5–10.5)
POTASSIUM SERPL-SCNC: 4.6 MMOL/L (ref 3.5–5.1)
PROT SERPL-MCNC: 6.9 G/DL (ref 6.4–8.2)
PSA SERPL DL<=0.01 NG/ML-MCNC: 23.36 NG/ML (ref 0–4)
RBC # BLD AUTO: 4.82 M/UL (ref 4.2–5.9)
SODIUM SERPL-SCNC: 134 MMOL/L (ref 136–145)
TRIGL SERPL-MCNC: 34 MG/DL (ref 0–150)
VLDLC SERPL CALC-MCNC: 7 MG/DL
WBC # BLD AUTO: 6.3 K/UL (ref 4–11)

## 2023-08-04 PROCEDURE — 3017F COLORECTAL CA SCREEN DOC REV: CPT | Performed by: NURSE PRACTITIONER

## 2023-08-04 PROCEDURE — 1123F ACP DISCUSS/DSCN MKR DOCD: CPT | Performed by: NURSE PRACTITIONER

## 2023-08-04 PROCEDURE — G0439 PPPS, SUBSEQ VISIT: HCPCS | Performed by: NURSE PRACTITIONER

## 2023-08-04 PROCEDURE — 3074F SYST BP LT 130 MM HG: CPT | Performed by: NURSE PRACTITIONER

## 2023-08-04 PROCEDURE — 3078F DIAST BP <80 MM HG: CPT | Performed by: NURSE PRACTITIONER

## 2023-08-04 ASSESSMENT — ANXIETY QUESTIONNAIRES
5. BEING SO RESTLESS THAT IT IS HARD TO SIT STILL: 0
IF YOU CHECKED OFF ANY PROBLEMS ON THIS QUESTIONNAIRE, HOW DIFFICULT HAVE THESE PROBLEMS MADE IT FOR YOU TO DO YOUR WORK, TAKE CARE OF THINGS AT HOME, OR GET ALONG WITH OTHER PEOPLE: NOT DIFFICULT AT ALL
7. FEELING AFRAID AS IF SOMETHING AWFUL MIGHT HAPPEN: 0
3. WORRYING TOO MUCH ABOUT DIFFERENT THINGS: 0
GAD7 TOTAL SCORE: 0
6. BECOMING EASILY ANNOYED OR IRRITABLE: 0
4. TROUBLE RELAXING: 0
1. FEELING NERVOUS, ANXIOUS, OR ON EDGE: 0
2. NOT BEING ABLE TO STOP OR CONTROL WORRYING: 0

## 2023-08-04 NOTE — PROGRESS NOTES
Medicare Annual Wellness Visit    [address] is here for Medicare AWV (No concerns )    Assessment & Plan   Medicare annual wellness visit, subsequent  Pulmonary emphysema, unspecified emphysema type ([address])  Malignant neoplasm of lung, unspecified laterality, unspecified part of lung ([address])  Primary hypertension  -     CBC; Future  -     Comprehensive Metabolic Panel; Future  Dyslipidemia  -     Lipid, Fasting; Future  Prostate cancer screening  -     PSA Screening; Future  Prediabetes  -     Hemoglobin A1C; Future    Continue current medications  Fasting blood work today  Follow up in 6 months    Recommendations for Preventive Services Due: see orders and patient instructions/AVS.  Recommended screening schedule for the next 5-10 years is provided to the patient in written form: see Patient Instructions/AVS.     No follow-ups on file. Subjective   The following acute and/or chronic problems were also addressed today:       Hypertension:  Home blood pressure monitoring: No. Patient denies chest pain and shortness of breath. Antihypertensive medication side effects: no medication side effects noted. Use of agents associated with hypertension: none. Hyperlipidemia:  No new myalgias or GI upset on atorvastatin (Lipitor). Continues to smoke 2 ppd  Not interested in quitting    He had LDCT scan in 12/2022- emphysematous changes, stable cavitary lesion    Did not have AAA screening- declines    Patient's complete Health Risk Assessment and screening values have been reviewed and are found in Flowsheets. The following problems were reviewed today and where indicated follow up appointments were made and/or referrals ordered. Positive Risk Factor Screenings with Interventions:       Cognitive:    Words recalled: 2 Words Recalled   Clock Drawing Test (CDT): (!) Abnormal   Total Score: (!) 2   Total Score Interpretation: Abnormal Mini-Cog      Interventions:  Patient declines any further evaluation or

## 2023-08-04 NOTE — PATIENT INSTRUCTIONS
- continue current medicatins  - fasting blood work today  - follow up 6 months      Learning About Mild Cognitive Impairment (MCI)  What is mild cognitive impairment (MCI)? It's common to forget things sometimes as we get older. But some older people have memory loss that's more than normal aging. It's called mild cognitive impairment, or MCI. It is not the same as dementia. People with the condition often know that their memory or mental function has changed. Tests may show some loss. But their minds work well overall. They can carry out daily tasks that are normal for them. People with MCI have a higher chance of one day getting dementia. But not all people who have it will get dementia. Some people may stay the same over time. What are the symptoms? People with MCI have more memory loss than what occurs with normal aging. They may have increasing trouble with recalling words and keeping up with conversations. They may also have trouble remembering important events and making decisions. What puts you at risk? The risk of getting MCI increases with age. Having high blood pressure or having a family history of MCI may also increase your risk. How is it diagnosed? Your doctor will do a physical exam.  You may be asked questions to check your memory and other mental skills. Your doctor may also talk to close friends and family members. This can help the doctor figure out how your memory and other mental skills have changed. You may get blood tests and tests that look at your brain. These questions and tests can make sure you don't have other conditions that can cause symptoms like MCI. These include depression, sleep problems, and side effects from medicines. How is it treated? There are no medicines to treat MCI or to keep it from progressing to dementia. But treating conditions like high blood pressure and diabetes may help.  A person with MCI needs routine follow-up visits with their doctor to check

## 2023-08-05 LAB
EST. AVERAGE GLUCOSE BLD GHB EST-MCNC: 134.1 MG/DL
HBA1C MFR BLD: 6.3 %

## 2023-08-08 DIAGNOSIS — R97.20 ELEVATED PSA, GREATER THAN OR EQUAL TO 20 NG/ML: Primary | ICD-10-CM

## 2023-08-08 RX ORDER — ATORVASTATIN CALCIUM 40 MG/1
40 TABLET, FILM COATED ORAL DAILY
Qty: 90 TABLET | Refills: 3 | Status: SHIPPED | OUTPATIENT
Start: 2023-08-08

## 2023-08-14 RX ORDER — LISINOPRIL 20 MG/1
20 TABLET ORAL DAILY
Qty: 90 TABLET | Refills: 3 | Status: SHIPPED | OUTPATIENT
Start: 2023-08-14

## 2023-10-07 ENCOUNTER — E-VISIT (OUTPATIENT)
Dept: PRIMARY CARE CLINIC | Age: 68
End: 2023-10-07
Payer: MEDICARE

## 2023-10-07 DIAGNOSIS — J01.90 ACUTE BACTERIAL SINUSITIS: Primary | ICD-10-CM

## 2023-10-07 DIAGNOSIS — B96.89 ACUTE BACTERIAL SINUSITIS: Primary | ICD-10-CM

## 2023-10-07 PROCEDURE — 99421 OL DIG E/M SVC 5-10 MIN: CPT | Performed by: NURSE PRACTITIONER

## 2023-10-07 ASSESSMENT — LIFESTYLE VARIABLES
SMOKING_YEARS: 50
SMOKING_STATUS: YES

## 2023-10-09 RX ORDER — AMOXICILLIN AND CLAVULANATE POTASSIUM 875; 125 MG/1; MG/1
1 TABLET, FILM COATED ORAL 2 TIMES DAILY
Qty: 14 TABLET | Refills: 0 | Status: SHIPPED | OUTPATIENT
Start: 2023-10-09 | End: 2023-10-16

## 2023-10-09 NOTE — PROGRESS NOTES
HPI: as per patient provided history  Exam: N/A (electronic visit)  ASSESSMENT/PLAN:  1. Acute bacterial sinusitis  To take antibiotic course through completion  Antihistamine of choice- Allegra, Zyrtec, Claritin  Mucinex may provide symptom relief  Sinus Flushing 2x day as able followed by nasal steroid inhalation as prescribed  Push fluids  Tylenol/Motrin for discomfort and fever  Sudafed 120mg twice daily if not hypertensive    - amoxicillin-clavulanate (AUGMENTIN) 875-125 MG per tablet; Take 1 tablet by mouth 2 times daily for 7 days  Dispense: 14 tablet; Refill: 0       Patient instructed to call the office if worsens, or fails to improve as anticipated. 5-10 minutes were spent on the digital evaluation and management of this patient.

## 2023-12-01 ENCOUNTER — TELEPHONE (OUTPATIENT)
Dept: FAMILY MEDICINE CLINIC | Age: 68
End: 2023-12-01

## 2023-12-01 NOTE — TELEPHONE ENCOUNTER
----- Message from [facility].  Leslie Gage sent at 11/30/2023  8:15 PM EST -----  Regarding: Blood Pressure check  Contact: [phone]  Blood pressure is  [phone]

## 2023-12-11 RX ORDER — AMLODIPINE BESYLATE 5 MG/1
5 TABLET ORAL DAILY
Qty: 90 TABLET | Refills: 1 | Status: SHIPPED | OUTPATIENT
Start: 2023-12-11

## 2023-12-19 ENCOUNTER — TELEPHONE (OUTPATIENT)
Dept: TELEMETRY | Age: 68
End: 2023-12-19

## 2023-12-19 DIAGNOSIS — F17.200 SMOKER: Primary | ICD-10-CM

## 2023-12-19 NOTE — TELEPHONE ENCOUNTER
Patient due for annual CT Lung Screening. Reminder letter mailed.    CT Lung Screening order has been pended to chart should you choose to sign it.  Routed to PCP.    Tania Wu RN

## 2023-12-26 ENCOUNTER — PATIENT MESSAGE (OUTPATIENT)
Dept: FAMILY MEDICINE CLINIC | Age: 68
End: 2023-12-26

## 2023-12-27 ENCOUNTER — TELEPHONE (OUTPATIENT)
Dept: FAMILY MEDICINE CLINIC | Age: 68
End: 2023-12-27

## 2023-12-27 NOTE — TELEPHONE ENCOUNTER
Spoke with son Markos Sherwood, he's going to give us a call back to let us know if tomorrow works for an appointment

## 2023-12-28 ENCOUNTER — OFFICE VISIT (OUTPATIENT)
Dept: FAMILY MEDICINE CLINIC | Age: 68
End: 2023-12-28
Payer: MEDICARE

## 2023-12-28 VITALS
WEIGHT: 216 LBS | BODY MASS INDEX: 27.72 KG/M2 | OXYGEN SATURATION: 95 % | DIASTOLIC BLOOD PRESSURE: 78 MMHG | TEMPERATURE: 97 F | RESPIRATION RATE: 16 BRPM | HEIGHT: 74 IN | SYSTOLIC BLOOD PRESSURE: 124 MMHG | HEART RATE: 81 BPM

## 2023-12-28 DIAGNOSIS — J40 BRONCHITIS: ICD-10-CM

## 2023-12-28 DIAGNOSIS — J43.9 PULMONARY EMPHYSEMA, UNSPECIFIED EMPHYSEMA TYPE (HCC): Primary | ICD-10-CM

## 2023-12-28 DIAGNOSIS — R09.89 CHEST CONGESTION: ICD-10-CM

## 2023-12-28 DIAGNOSIS — R05.9 COUGH, UNSPECIFIED TYPE: ICD-10-CM

## 2023-12-28 DIAGNOSIS — Z91.010 H/O PEANUT ALLERGY: ICD-10-CM

## 2023-12-28 LAB
Lab: 0
PERFORMING INSTRUMENT: NORMAL
QC PASS/FAIL: NORMAL
RSV RNA: NORMAL
SARS-COV-2, POC: NORMAL

## 2023-12-28 PROCEDURE — G8417 CALC BMI ABV UP PARAM F/U: HCPCS | Performed by: NURSE PRACTITIONER

## 2023-12-28 PROCEDURE — G8484 FLU IMMUNIZE NO ADMIN: HCPCS | Performed by: NURSE PRACTITIONER

## 2023-12-28 PROCEDURE — G8427 DOCREV CUR MEDS BY ELIG CLIN: HCPCS | Performed by: NURSE PRACTITIONER

## 2023-12-28 PROCEDURE — 1123F ACP DISCUSS/DSCN MKR DOCD: CPT | Performed by: NURSE PRACTITIONER

## 2023-12-28 PROCEDURE — 3078F DIAST BP <80 MM HG: CPT | Performed by: NURSE PRACTITIONER

## 2023-12-28 PROCEDURE — 99214 OFFICE O/P EST MOD 30 MIN: CPT | Performed by: NURSE PRACTITIONER

## 2023-12-28 PROCEDURE — 3017F COLORECTAL CA SCREEN DOC REV: CPT | Performed by: NURSE PRACTITIONER

## 2023-12-28 PROCEDURE — 3023F SPIROM DOC REV: CPT | Performed by: NURSE PRACTITIONER

## 2023-12-28 PROCEDURE — 87634 RSV DNA/RNA AMP PROBE: CPT | Performed by: NURSE PRACTITIONER

## 2023-12-28 PROCEDURE — 3074F SYST BP LT 130 MM HG: CPT | Performed by: NURSE PRACTITIONER

## 2023-12-28 PROCEDURE — 4004F PT TOBACCO SCREEN RCVD TLK: CPT | Performed by: NURSE PRACTITIONER

## 2023-12-28 PROCEDURE — 87426 SARSCOV CORONAVIRUS AG IA: CPT | Performed by: NURSE PRACTITIONER

## 2023-12-28 RX ORDER — LINACLOTIDE 145 UG/1
145 CAPSULE, GELATIN COATED ORAL DAILY
COMMUNITY
Start: 2023-12-19

## 2023-12-28 RX ORDER — AZITHROMYCIN 250 MG/1
250 TABLET, FILM COATED ORAL SEE ADMIN INSTRUCTIONS
Qty: 6 TABLET | Refills: 0 | Status: SHIPPED | OUTPATIENT
Start: 2023-12-28 | End: 2024-01-02

## 2023-12-28 RX ORDER — EPINEPHRINE 0.3 MG/.3ML
0.3 INJECTION SUBCUTANEOUS PRN
Qty: 2 EACH | Refills: 1 | Status: SHIPPED | OUTPATIENT
Start: 2023-12-28

## 2023-12-28 RX ORDER — ALBUTEROL SULFATE 90 UG/1
2 AEROSOL, METERED RESPIRATORY (INHALATION) 4 TIMES DAILY PRN
Qty: 18 G | Refills: 0 | Status: SHIPPED | OUTPATIENT
Start: 2023-12-28

## 2023-12-28 NOTE — PROGRESS NOTES
12/28/2023    This is a 77 y.o. male   Chief Complaint   Patient presents with    Other     chest congestion, occasional coughing with thick milky mucus, SOB, x 2 days   . Jude Cockayne is seen today for cough and congestion. Cough  This is a new problem. The current episode started in the past 7 days. The problem has been rapidly worsening. The problem occurs constantly. The cough is Productive of sputum. Associated symptoms include ear congestion, postnasal drip, shortness of breath and wheezing. Pertinent negatives include no chest pain, chills, ear pain, fever, headaches, heartburn, myalgias, nasal congestion, rash, sore throat, sweats or weight loss. The symptoms are aggravated by other. He has tried nothing for the symptoms. The treatment provided no relief. His past medical history is significant for bronchitis and environmental allergies. There is no history of asthma, bronchiectasis, COPD, emphysema or pneumonia. Patient Active Problem List   Diagnosis    TIA (transient ischemic attack)    Lung mass    Arterial ischemic stroke, ICA, right, acute (HCC)    Tobacco abuse    Dyslipidemia    Hyponatremia    Lung cancer (HCC)    H/O peanut allergy    Elevated blood-pressure reading without diagnosis of hypertension    Primary hypertension    Prediabetes    Elevated PSA, between 10 and less than 20 ng/ml    Pulmonary emphysema (HCC)       Current Outpatient Medications   Medication Sig Dispense Refill    LINZESS 145 MCG capsule Take 1 capsule by mouth daily      atorvastatin (LIPITOR) 40 MG tablet Take 1 tablet by mouth daily 90 tablet 3    amLODIPine (NORVASC) 5 MG tablet TAKE 1 TABLET BY MOUTH DAILY 90 tablet 1    lisinopril (PRINIVIL;ZESTRIL) 20 MG tablet TAKE 1 TABLET BY MOUTH DAILY 90 tablet 3    triamcinolone (KENALOG) 0.1 % cream Apply topically 2 times daily.  45 g 0    EPINEPHrine (EPIPEN 2-KAMI) 0.3 MG/0.3ML SOAJ injection Inject 0.3 mLs into the muscle once as needed (severe allergic reaction) Inject

## 2024-01-19 RX ORDER — ALBUTEROL SULFATE 90 UG/1
AEROSOL, METERED RESPIRATORY (INHALATION)
Qty: 8.5 G | Refills: 3 | Status: SHIPPED | OUTPATIENT
Start: 2024-01-19

## 2024-01-24 RX ORDER — AMOXICILLIN AND CLAVULANATE POTASSIUM 875; 125 MG/1; MG/1
1 TABLET, FILM COATED ORAL 2 TIMES DAILY
Qty: 14 TABLET | Refills: 0 | Status: SHIPPED | OUTPATIENT
Start: 2024-01-24 | End: 2024-01-31

## 2024-02-09 ENCOUNTER — HOSPITAL ENCOUNTER (OUTPATIENT)
Age: 69
Discharge: HOME | End: 2024-02-09
Payer: MEDICARE

## 2024-02-09 ENCOUNTER — HOSPITAL ENCOUNTER (OUTPATIENT)
Dept: GENERAL RADIOLOGY | Age: 69
Discharge: HOME | End: 2024-02-09
Payer: MEDICARE

## 2024-02-09 DIAGNOSIS — R05.1 ACUTE COUGH: ICD-10-CM

## 2024-02-09 DIAGNOSIS — J40 BRONCHITIS: ICD-10-CM

## 2024-02-09 DIAGNOSIS — J43.9 PULMONARY EMPHYSEMA, UNSPECIFIED EMPHYSEMA TYPE (HCC): ICD-10-CM

## 2024-02-09 PROCEDURE — 71046 X-RAY EXAM CHEST 2 VIEWS: CPT

## 2024-02-13 ENCOUNTER — PATIENT MESSAGE (OUTPATIENT)
Dept: FAMILY MEDICINE CLINIC | Age: 69
End: 2024-02-13

## 2024-02-13 RX ORDER — DOXYCYCLINE HYCLATE 100 MG
100 TABLET ORAL 2 TIMES DAILY
Qty: 14 TABLET | Refills: 0 | Status: SHIPPED | OUTPATIENT
Start: 2024-02-13 | End: 2024-02-20

## 2024-02-13 NOTE — TELEPHONE ENCOUNTER
From: Lennox Dubois  To: Ellie Green  Sent: 2/13/2024 1:04 AM EST  Subject: Chest congestion/bronchitis Update    Ray’s blood oxygen checks have read 98%  His blood pressure reading on   2/12/24  Late Afternoon   130/78  77    Ray has improved a lot since the video appointment but is still experiencing chest congestion and coughing mucus up which he spits out. It is mostly milky white but sometimes has a brownish tint to it earlier in the mornings.   He’s using his Albuterol inhaler which helps.     Should he be on another prescription of antibiotics?     Thank you   -Frederic Dubois

## 2024-02-19 ENCOUNTER — TELEPHONE (OUTPATIENT)
Dept: TELEMETRY | Age: 69
End: 2024-02-19

## 2024-02-19 NOTE — TELEPHONE ENCOUNTER
Patient due for annual CT Lung Screening. Reminder letter mailed.    Scan already ordered. Central Scheduling number provided.    Tania Wu RN

## 2024-03-11 DIAGNOSIS — Z85.118 HX OF CANCER OF LUNG: ICD-10-CM

## 2024-03-11 DIAGNOSIS — J43.9 PULMONARY EMPHYSEMA, UNSPECIFIED EMPHYSEMA TYPE (HCC): Primary | ICD-10-CM

## 2024-03-21 ENCOUNTER — PATIENT MESSAGE (OUTPATIENT)
Dept: FAMILY MEDICINE CLINIC | Age: 69
End: 2024-03-21

## 2024-03-21 DIAGNOSIS — J43.9 PULMONARY EMPHYSEMA, UNSPECIFIED EMPHYSEMA TYPE (HCC): Primary | ICD-10-CM

## 2024-03-21 NOTE — TELEPHONE ENCOUNTER
From: Lennox Dubois  To: Ellie Green  Sent: 3/21/2024 4:21 PM EDT  Subject: Emphysema treatments     Hello, this is Lennox Dubois’s son Frederic.   Lennox has an appointment with [facility] on April 9th 10 AM.     His oxygen readings have been between 95-97, usually 95-96. He’s using his Albuterol inhaler 3-4 times per day. Are there any additional medications you would recommend to help with the emphysema?   His main complaint is getting fatigued quickly.  He’s averaging 6-7 hours of sleep in total per night. He is on a chest decongestant, Mucinex.     Thank you  -Frederic Dubois

## 2024-03-21 NOTE — TELEPHONE ENCOUNTER
Frederic is one HIPAA.    1/25/2024    Future Appointments   Date Time Provider Department Center   4/9/2024 10:00 AM Dorie Damon MD [facility]

## 2024-03-29 ENCOUNTER — TELEMEDICINE (OUTPATIENT)
Dept: FAMILY MEDICINE CLINIC | Age: 69
End: 2024-03-29
Payer: MEDICARE

## 2024-03-29 DIAGNOSIS — R09.89 RUNNY NOSE: ICD-10-CM

## 2024-03-29 DIAGNOSIS — R05.8 PRODUCTIVE COUGH: Primary | ICD-10-CM

## 2024-03-29 PROCEDURE — 99213 OFFICE O/P EST LOW 20 MIN: CPT | Performed by: FAMILY MEDICINE

## 2024-03-29 PROCEDURE — G8427 DOCREV CUR MEDS BY ELIG CLIN: HCPCS | Performed by: FAMILY MEDICINE

## 2024-03-29 PROCEDURE — 3017F COLORECTAL CA SCREEN DOC REV: CPT | Performed by: FAMILY MEDICINE

## 2024-03-29 PROCEDURE — 1123F ACP DISCUSS/DSCN MKR DOCD: CPT | Performed by: FAMILY MEDICINE

## 2024-03-29 RX ORDER — BENZONATATE 200 MG/1
200 CAPSULE ORAL 3 TIMES DAILY PRN
Qty: 30 CAPSULE | Refills: 2 | Status: SHIPPED | OUTPATIENT
Start: 2024-03-29

## 2024-03-29 RX ORDER — FLUTICASONE FUROATE AND VILANTEROL 100; 25 UG/1; UG/1
1 POWDER RESPIRATORY (INHALATION) DAILY
Qty: 28 EACH | Refills: 0 | Status: SHIPPED | OUTPATIENT
Start: 2024-03-29

## 2024-03-29 RX ORDER — ALBUTEROL SULFATE 90 UG/1
AEROSOL, METERED RESPIRATORY (INHALATION)
Qty: 8.5 G | Refills: 3 | Status: SHIPPED | OUTPATIENT
Start: 2024-03-29

## 2024-03-29 RX ORDER — AMOXICILLIN AND CLAVULANATE POTASSIUM 875; 125 MG/1; MG/1
1 TABLET, FILM COATED ORAL 2 TIMES DAILY
Qty: 14 TABLET | Refills: 0 | Status: SHIPPED | OUTPATIENT
Start: 2024-03-29 | End: 2024-04-05

## 2024-03-29 ASSESSMENT — ENCOUNTER SYMPTOMS: COUGH: 1

## 2024-03-29 NOTE — PROGRESS NOTES
[x] No Facial Asymmetry (Cranial nerve 7 motor function) (limited exam to video visit)          [x] No gaze palsy        [] Abnormal-         Skin:        [x] No significant exanthematous lesions or discoloration noted on facial skin         [] Abnormal-            Psychiatric:       [x] Normal Affect [] No Hallucinations        [] Abnormal-     Other pertinent observable physical exam findings-     ASSESSMENT/PLAN:   Diagnosis Orders   1. Productive cough  amoxicillin-clavulanate (AUGMENTIN) 875-125 MG per tablet    benzonatate (TESSALON) 200 MG capsule      2. Runny nose  amoxicillin-clavulanate (AUGMENTIN) 875-125 MG per tablet        Vicks Vaporizer, Vicks VapoRub on the chest, neck, and sinuses at night, and drink water to thin secretions.  Eat one small yogurt daily to ensure probiotics while taking the antibiotic.    Return if symptoms worsen or fail to improve.    Lennox Dubois, was evaluated through a synchronous (real-time) audio-video encounter. The patient (or guardian if applicable) is aware that this is a billable service. Verbal consent to proceed has been obtained within the past 12 months. The visit was conducted pursuant to the emergency declaration under the [place] Act and the National Emergencies Act, 1135 waiver authority and the Coronavirus Preparedness and Response Supplemental Appropriations Act.  Patient identification was verified, and a caregiver was present when appropriate. The patient was located in a state where the provider was credentialed to provide care.    Total time spent on this encounter: Not billed by time    --Cezar Hoang DO on 3/29/2024 at 10:51 AM    An electronic signature was used to authenticate this note.

## 2024-04-09 ENCOUNTER — OFFICE VISIT (OUTPATIENT)
Dept: PULMONOLOGY | Age: 69
End: 2024-04-09
Payer: MEDICARE

## 2024-04-09 VITALS
DIASTOLIC BLOOD PRESSURE: 64 MMHG | SYSTOLIC BLOOD PRESSURE: 138 MMHG | WEIGHT: 216 LBS | BODY MASS INDEX: 27.72 KG/M2 | OXYGEN SATURATION: 93 % | HEART RATE: 86 BPM | RESPIRATION RATE: 19 BRPM | HEIGHT: 74 IN

## 2024-04-09 DIAGNOSIS — R91.8 LUNG MASS: Primary | ICD-10-CM

## 2024-04-09 DIAGNOSIS — J43.9 PULMONARY EMPHYSEMA, UNSPECIFIED EMPHYSEMA TYPE (HCC): ICD-10-CM

## 2024-04-09 DIAGNOSIS — C34.90 MALIGNANT NEOPLASM OF LUNG, UNSPECIFIED LATERALITY, UNSPECIFIED PART OF LUNG (HCC): ICD-10-CM

## 2024-04-09 PROCEDURE — 1123F ACP DISCUSS/DSCN MKR DOCD: CPT | Performed by: INTERNAL MEDICINE

## 2024-04-09 PROCEDURE — 3078F DIAST BP <80 MM HG: CPT | Performed by: INTERNAL MEDICINE

## 2024-04-09 PROCEDURE — 3075F SYST BP GE 130 - 139MM HG: CPT | Performed by: INTERNAL MEDICINE

## 2024-04-09 PROCEDURE — G8417 CALC BMI ABV UP PARAM F/U: HCPCS | Performed by: INTERNAL MEDICINE

## 2024-04-09 PROCEDURE — 99204 OFFICE O/P NEW MOD 45 MIN: CPT | Performed by: INTERNAL MEDICINE

## 2024-04-09 PROCEDURE — 3017F COLORECTAL CA SCREEN DOC REV: CPT | Performed by: INTERNAL MEDICINE

## 2024-04-09 PROCEDURE — 3023F SPIROM DOC REV: CPT | Performed by: INTERNAL MEDICINE

## 2024-04-09 PROCEDURE — 4004F PT TOBACCO SCREEN RCVD TLK: CPT | Performed by: INTERNAL MEDICINE

## 2024-04-09 PROCEDURE — G8427 DOCREV CUR MEDS BY ELIG CLIN: HCPCS | Performed by: INTERNAL MEDICINE

## 2024-04-09 RX ORDER — FLUTICASONE FUROATE, UMECLIDINIUM BROMIDE AND VILANTEROL TRIFENATATE 200; 62.5; 25 UG/1; UG/1; UG/1
1 POWDER RESPIRATORY (INHALATION) DAILY
Qty: 1 EACH | Refills: 2 | Status: SHIPPED | OUTPATIENT
Start: 2024-04-09

## 2024-04-09 RX ORDER — AZITHROMYCIN 250 MG/1
250 TABLET, FILM COATED ORAL DAILY
Qty: 10 TABLET | Refills: 0 | Status: SHIPPED | OUTPATIENT
Start: 2024-04-09 | End: 2024-04-19

## 2024-04-09 RX ORDER — GUAIFENESIN 600 MG/1
1200 TABLET, EXTENDED RELEASE ORAL 2 TIMES DAILY PRN
COMMUNITY
Start: 2024-04-09

## 2024-04-09 NOTE — PROGRESS NOTES
P  [facility]                                               Pulmonary Clinic Consult     I had the pleasure of seeing  Lennox Dubois     Chief Complaint   Patient presents with    New Patient     Emphysema  R/b sanchez  Sob, o2 drops to 87%-90% when active        HISTORY OF PRESENT ILLNESS:    Lennox Dubois is a 68 y.o. year old  Who start smoking at age  13  And increase gradually and ahs about 55 PPY smoking history ,now 1/2 pack per day       The Patient comes in with SOB that has been going on the last 6 months  years   Associated with cough and congestion and rattling     He states that it get worse with exercise or walking long distance and he can walk 100 feet And go less  flight of stairs before get short winded    He has cough ,productive for clear-yellow Sputum and it is more in the ,had green at time     He work in farm       Sleep history :  Snoring no  Feel tired during the day no   Wake up fresh no   excessive daytime sleepiness no   He is on albuterol   He is on Breo         ALLERGIES:    Allergies   Allergen Reactions    Peanut-Containing Drug Products     Prednisone Hives, Itching, Nausea And Vomiting and Rash       PAST MEDICAL HISTORY:       Diagnosis Date    Cancer ([facility])     lung ca 2001    Stroke (cerebrum) ([facility])        MEDICATIONS:   Current Outpatient Medications   Medication Sig Dispense Refill    benzonatate (TESSALON) 200 MG capsule Take 1 capsule by mouth 3 times daily as needed for Cough (prn coughing) 30 capsule 2    albuterol sulfate HFA (PROVENTIL;VENTOLIN;PROAIR) 108 (90 Base) MCG/ACT inhaler INHALE 2 PUFFS INTO THE LUNGS FOUR TIMES DAILY AS NEEDED FOR WHEEZING 8.5 g 3    fluticasone furoate-vilanterol (BREO ELLIPTA) 100-25 MCG/ACT inhaler Inhale 1 puff into the lungs daily 28 each 0    LINZESS 145 MCG capsule Take 1 capsule by mouth daily      EPINEPHrine (EPIPEN 2-KAMI) 0.3 MG/0.3ML SOAJ injection Inject 0.3 mLs into the muscle as needed

## 2024-04-11 ENCOUNTER — PATIENT MESSAGE (OUTPATIENT)
Dept: PULMONOLOGY | Age: 69
End: 2024-04-11

## 2024-04-11 NOTE — TELEPHONE ENCOUNTER
From: Lennox Dubois  To: Dr. Dorie Damon  Sent: 4/11/2024 3:41 AM EDT  Subject: Insomnia     Lennox Dubois (my father) seems to be experiencing insomnia. He has tried melatonin 3-6 mg before bed. Is there anything that he can safely take to help him sleep?     He took his first dose of the antibiotic Azithromycin on the 10th. He started Trelegy Inhaler on the 9th.     Thank you  -Frederic Dubois

## 2024-04-25 ENCOUNTER — PATIENT MESSAGE (OUTPATIENT)
Dept: PULMONOLOGY | Age: 69
End: 2024-04-25

## 2024-04-25 NOTE — TELEPHONE ENCOUNTER
From: Lennox SARAH Sherly  To: Dr. Dorie Damon  Sent: 4/25/2024 3:38 PM EDT  Subject: Chest congestion and update     Lennox Dubois has not smoked since April 10th. He finished antibiotics as prescribed. He’s on mucinex twice per day. He has been sleeping well until last night 4-24. He still spits up mucus. Mostly clear but more thick in the mornings. Today 4-25 he complains of being achey and has chest discomfort in right lung. Oxygen levels have improved. Low 93 after a short walk to 96-98 when rested.     -Fredericgia ONTIVEROS (son)

## 2024-05-01 RX ORDER — DOXYCYCLINE HYCLATE 100 MG
100 TABLET ORAL 2 TIMES DAILY
Qty: 10 TABLET | Refills: 0 | Status: SHIPPED | OUTPATIENT
Start: 2024-05-01 | End: 2024-05-06

## 2024-05-03 RX ORDER — DEXAMETHASONE 2 MG/1
2 TABLET ORAL 3 TIMES DAILY
Qty: 12 TABLET | Refills: 0 | OUTPATIENT
Start: 2024-05-03 | End: 2024-05-07

## 2024-05-06 ENCOUNTER — PATIENT MESSAGE (OUTPATIENT)
Dept: FAMILY MEDICINE CLINIC | Age: 69
End: 2024-05-06

## 2024-05-06 NOTE — TELEPHONE ENCOUNTER
From: Lennox Dubois  To: Ellie Green  Sent: 5/6/2024 4:10 PM EDT  Subject: Home physical therapy     Can Lennox Dubois get a prescription for at home physical therapy?   His oxygen levels have greatly improved 93 after short walks 96-98 resting but he has lost strength over time and needs guidance with rebuilding strength.   Thank you  -Frederic Dubois (POA son)

## 2024-05-16 ENCOUNTER — TELEPHONE (OUTPATIENT)
Dept: FAMILY MEDICINE CLINIC | Age: 69
End: 2024-05-16

## 2024-05-17 ENCOUNTER — TELEPHONE (OUTPATIENT)
Dept: FAMILY MEDICINE CLINIC | Age: 69
End: 2024-05-17

## 2024-05-17 NOTE — TELEPHONE ENCOUNTER
Aleksandar olson/[facility] called to get verbal orders for SN (pressure ulcers), PT or OT.     Please advise.

## 2024-05-20 ENCOUNTER — TELEPHONE (OUTPATIENT)
Dept: FAMILY MEDICINE CLINIC | Age: 69
End: 2024-05-20

## 2024-05-20 NOTE — TELEPHONE ENCOUNTER
Jossie with [facility] called.  She is requesting a referral to a [facility] for swelling and trouble with breathing when he lays back.

## 2024-05-20 NOTE — TELEPHONE ENCOUNTER
Jossie with [facility] called back to request a water pill for the swelling in the patient's legs until he can get into a the cardiologist she requested a referral to in the previous message.     This should be sent to Susan on file.

## 2024-05-21 ENCOUNTER — TELEPHONE (OUTPATIENT)
Dept: FAMILY MEDICINE CLINIC | Age: 69
End: 2024-05-21

## 2024-05-21 DIAGNOSIS — R06.00 DYSPNEA, UNSPECIFIED TYPE: ICD-10-CM

## 2024-05-21 DIAGNOSIS — M79.89 LEG SWELLING: Primary | ICD-10-CM

## 2024-05-21 RX ORDER — FUROSEMIDE 20 MG/1
20 TABLET ORAL DAILY
Qty: 7 TABLET | Refills: 0 | Status: SHIPPED | OUTPATIENT
Start: 2024-05-21 | End: 2024-05-28

## 2024-05-21 SDOH — ECONOMIC STABILITY: FOOD INSECURITY: WITHIN THE PAST 12 MONTHS, THE FOOD YOU BOUGHT JUST DIDN'T LAST AND YOU DIDN'T HAVE MONEY TO GET MORE.: NEVER TRUE

## 2024-05-21 SDOH — ECONOMIC STABILITY: FOOD INSECURITY: WITHIN THE PAST 12 MONTHS, YOU WORRIED THAT YOUR FOOD WOULD RUN OUT BEFORE YOU GOT MONEY TO BUY MORE.: NEVER TRUE

## 2024-05-21 SDOH — ECONOMIC STABILITY: INCOME INSECURITY: HOW HARD IS IT FOR YOU TO PAY FOR THE VERY BASICS LIKE FOOD, HOUSING, MEDICAL CARE, AND HEATING?: NOT HARD AT ALL

## 2024-05-21 ASSESSMENT — PATIENT HEALTH QUESTIONNAIRE - PHQ9
1. LITTLE INTEREST OR PLEASURE IN DOING THINGS: NOT AT ALL
SUM OF ALL RESPONSES TO PHQ QUESTIONS 1-9: 0
SUM OF ALL RESPONSES TO PHQ9 QUESTIONS 1 & 2: 0
2. FEELING DOWN, DEPRESSED OR HOPELESS: NOT AT ALL
DEPRESSION UNABLE TO ASSESS: FUNCTIONAL CAPACITY MOTIVATION LIMITS ACCURACY

## 2024-05-21 ASSESSMENT — ANXIETY QUESTIONNAIRES
3. WORRYING TOO MUCH ABOUT DIFFERENT THINGS: NOT AT ALL
IF YOU CHECKED OFF ANY PROBLEMS ON THIS QUESTIONNAIRE, HOW DIFFICULT HAVE THESE PROBLEMS MADE IT FOR YOU TO DO YOUR WORK, TAKE CARE OF THINGS AT HOME, OR GET ALONG WITH OTHER PEOPLE: NOT DIFFICULT AT ALL
1. FEELING NERVOUS, ANXIOUS, OR ON EDGE: NOT AT ALL
7. FEELING AFRAID AS IF SOMETHING AWFUL MIGHT HAPPEN: NOT AT ALL
2. NOT BEING ABLE TO STOP OR CONTROL WORRYING: NOT AT ALL
GAD7 TOTAL SCORE: 0
4. TROUBLE RELAXING: NOT AT ALL
6. BECOMING EASILY ANNOYED OR IRRITABLE: NOT AT ALL
5. BEING SO RESTLESS THAT IT IS HARD TO SIT STILL: NOT AT ALL

## 2024-05-21 NOTE — TELEPHONE ENCOUNTER
He needs an appointment to be assessed and properly treated. It will take a few weeks to get an appointment with cardiology.

## 2024-05-21 NOTE — TELEPHONE ENCOUNTER
Frederic advised stated that ray can still move around and that hip is not that bad and that the swelling has been maria isabel on for months stated that he would have to set up transportation for ray to get anywhere

## 2024-05-21 NOTE — TELEPHONE ENCOUNTER
Please call patient son Frederic, if he had a fall and he is having severe hip pain I would recommend ED evaluation to rule out hip fracture.  With his swelling and shortness of breath it is also probably a good idea for him to be evaluated in the emergency room for heart failure.  They will not take him to the ED for evaluation today they can try the Lasix 20 mg that I sent to the pharmacy for the swelling.

## 2024-05-21 NOTE — TELEPHONE ENCOUNTER
Mireya From [facility] called stating that patient is having significant pain in his hip from a fall he had this morning. Mireya also states patient is complaining about severe swelling and he can feel the pressure from the swelling when he lays down. Mireya called to see if anything is able to be done for the patient before Virtual Visit on Thursday.       Please call Frederic Moreira son

## 2024-05-22 ENCOUNTER — HOSPITAL ENCOUNTER (OUTPATIENT)
Age: 69
Setting detail: SPECIMEN
Discharge: HOME | End: 2024-05-22
Payer: MEDICARE

## 2024-05-22 LAB
ANION GAP SERPL CALCULATED.3IONS-SCNC: 10 MMOL/L (ref 3–16)
BASOPHILS # BLD: 0 K/UL (ref 0–0.2)
BASOPHILS NFR BLD: 0.2 %
BUN SERPL-MCNC: 9 MG/DL (ref 7–20)
CALCIUM SERPL-MCNC: 8.9 MG/DL (ref 8.3–10.6)
CHLORIDE SERPL-SCNC: 95 MMOL/L (ref 99–110)
CO2 SERPL-SCNC: 27 MMOL/L (ref 21–32)
CREAT SERPL-MCNC: <0.5 MG/DL (ref 0.8–1.3)
DEPRECATED RDW RBC AUTO: 16.9 % (ref 12.4–15.4)
EOSINOPHIL # BLD: 0.1 K/UL (ref 0–0.6)
EOSINOPHIL NFR BLD: 1.1 %
GFR SERPLBLD CREATININE-BSD FMLA CKD-EPI: >90 ML/MIN/{1.73_M2}
GLUCOSE SERPL-MCNC: 73 MG/DL (ref 70–99)
HCT VFR BLD AUTO: 38.3 % (ref 40.5–52.5)
HGB BLD-MCNC: 12.3 G/DL (ref 13.5–17.5)
LYMPHOCYTES # BLD: 0.8 K/UL (ref 1–5.1)
LYMPHOCYTES NFR BLD: 6.2 %
MCH RBC QN AUTO: 29.2 PG (ref 26–34)
MCHC RBC AUTO-ENTMCNC: 32.2 G/DL (ref 31–36)
MCV RBC AUTO: 90.6 FL (ref 80–100)
MONOCYTES # BLD: 1.5 K/UL (ref 0–1.3)
MONOCYTES NFR BLD: 12.3 %
NEUTROPHILS # BLD: 9.8 K/UL (ref 1.7–7.7)
NEUTROPHILS NFR BLD: 80.2 %
NT-PROBNP SERPL-MCNC: 179 PG/ML (ref 0–124)
PLATELET # BLD AUTO: 309 K/UL (ref 135–450)
PMV BLD AUTO: 7.6 FL (ref 5–10.5)
POTASSIUM SERPL-SCNC: 3.5 MMOL/L (ref 3.5–5.1)
RBC # BLD AUTO: 4.23 M/UL (ref 4.2–5.9)
SODIUM SERPL-SCNC: 132 MMOL/L (ref 136–145)
WBC # BLD AUTO: 12.3 K/UL (ref 4–11)

## 2024-05-22 PROCEDURE — 85025 COMPLETE CBC W/AUTO DIFF WBC: CPT

## 2024-05-22 PROCEDURE — 80048 BASIC METABOLIC PNL TOTAL CA: CPT

## 2024-05-22 PROCEDURE — 83880 ASSAY OF NATRIURETIC PEPTIDE: CPT

## 2024-05-23 ENCOUNTER — TELEMEDICINE (OUTPATIENT)
Dept: FAMILY MEDICINE CLINIC | Age: 69
End: 2024-05-23
Payer: MEDICARE

## 2024-05-23 DIAGNOSIS — Z86.73 HISTORY OF STROKE: ICD-10-CM

## 2024-05-23 DIAGNOSIS — R06.01 ORTHOPNEA: ICD-10-CM
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
## 2024-05-23 DIAGNOSIS — J43.9 PULMONARY EMPHYSEMA, UNSPECIFIED EMPHYSEMA TYPE (HCC): ICD-10-CM

## 2024-05-23 DIAGNOSIS — M79.89 LEG SWELLING: Primary | ICD-10-CM

## 2024-05-23 DIAGNOSIS — R06.02 SHORTNESS OF BREATH: ICD-10-CM

## 2024-05-23 DIAGNOSIS — R53.1 GENERALIZED WEAKNESS: ICD-10-CM

## 2024-05-23 PROCEDURE — 1123F ACP DISCUSS/DSCN MKR DOCD: CPT | Performed by: NURSE PRACTITIONER

## 2024-05-23 PROCEDURE — 4004F PT TOBACCO SCREEN RCVD TLK: CPT | Performed by: NURSE PRACTITIONER

## 2024-05-23 PROCEDURE — G8417 CALC BMI ABV UP PARAM F/U: HCPCS | Performed by: NURSE PRACTITIONER

## 2024-05-23 PROCEDURE — 99214 OFFICE O/P EST MOD 30 MIN: CPT | Performed by: NURSE PRACTITIONER

## 2024-05-23 PROCEDURE — 3023F SPIROM DOC REV: CPT | Performed by: NURSE PRACTITIONER

## 2024-05-23 PROCEDURE — 3017F COLORECTAL CA SCREEN DOC REV: CPT | Performed by: NURSE PRACTITIONER

## 2024-05-23 PROCEDURE — G8427 DOCREV CUR MEDS BY ELIG CLIN: HCPCS | Performed by: NURSE PRACTITIONER

## 2024-05-23 RX ORDER — AZITHROMYCIN 250 MG/1
TABLET, FILM COATED ORAL
Qty: 6 TABLET | Refills: 0 | Status: SHIPPED | OUTPATIENT
Start: 2024-05-23 | End: 2024-06-02

## 2024-05-23 RX ORDER — DEXAMETHASONE 2 MG/1
2 TABLET ORAL 3 TIMES DAILY
Qty: 12 TABLET | Refills: 0 | Status: SHIPPED | OUTPATIENT
Start: 2024-05-23 | End: 2024-05-27

## 2024-05-23 ASSESSMENT — ENCOUNTER SYMPTOMS
SHORTNESS OF BREATH: 1
COUGH: 1
NAUSEA: 0
VOMITING: 0
DIARRHEA: 0
CHEST TIGHTNESS: 1

## 2024-05-23 NOTE — PATIENT INSTRUCTIONS
Start Lasix 20 mg QD  Continue leg compression and elevation  Needs echo to eval for HF  Low salt diet  Daily weights  Z pack and decadron burst for emphysema- increased cough/sputum production; call pulmonology if no improvement  Use spacer with albuterol inhaler  Wheelchair dme [facility]  [facility] to repeat BMP/CBC/BNP in one week

## 2024-05-23 NOTE — PROGRESS NOTES
distances in the home due to weakness and shortness of breath. Did not start Lasix yesterday- pharmacy did not have it. Will plant to start today.   Recent labs show , WBC 12.2, Hgb 12.3, HCT 38.3, RDW 16.9, neutrophils 9.8, lymphocytes, 0.8, monocytes 1.5, Na 132, Gfr >90, K+ 3.5    Ray SARAH Dubois was evaluated today and a DME order was entered for a standard wheelchair because he requires this to successfully complete daily living tasks of toileting, personal cares, ambulating, and meal preparation.  A standard manual wheelchair is necessary due to patient's impaired ambulation and mobility restrictions and would be unable to resolve these daily living tasks using a cane or walker.  The patient is capable of using a standard wheelchair safely in their home and can maneuver within their home with adequate access.  There is a caregiver available to provide necessary assistance.  The need for this equipment was discussed with the patient and he understands, is in agreement, and has not expressed an unwillingness to use the wheelchair.       Review of Systems   Constitutional:  Negative for fatigue and fever.   Respiratory:  Positive for cough, chest tightness and shortness of breath.    Cardiovascular:  Positive for leg swelling. Negative for chest pain and palpitations.        + orthopnea, sleeping sitting up in chair   Gastrointestinal:  Negative for diarrhea, nausea and vomiting.   Skin:         Reports bruise on left buttocks from fall   Neurological:  Positive for weakness. Negative for dizziness and headaches.          Objective   Patient-Reported Vitals  No data recorded     Physical Exam  [INSTRUCTIONS:  \"[x]\" Indicates a positive item  \"[]\" Indicates a negative item  -- DELETE ALL ITEMS NOT EXAMINED]    Constitutional: [x] Appears well-developed and well-nourished [x] No apparent distress      [] Abnormal -     Mental status: [x] Alert and awake  [x] Oriented to person/place/time [x] Able to follow

## 2024-05-28 RX ORDER — FUROSEMIDE 20 MG/1
20 TABLET ORAL DAILY
Qty: 30 TABLET | Refills: 0 | Status: SHIPPED | OUTPATIENT
Start: 2024-05-28 | End: 2024-06-27

## 2024-05-28 NOTE — TELEPHONE ENCOUNTER
Future Appointments   Date Time Provider Department Center   6/7/2024  2:00 PM SCHEDULE, [facility] [place] [facility]   6/28/2024  3:15 PM [facility]

## 2024-05-29 ENCOUNTER — HOSPITAL ENCOUNTER (OUTPATIENT)
Age: 69
Setting detail: SPECIMEN
Discharge: HOME | End: 2024-05-29
Payer: MEDICARE

## 2024-05-29 LAB
ALBUMIN SERPL-MCNC: 3.2 G/DL (ref 3.4–5)
ALBUMIN/GLOB SERPL: 1.5 {RATIO} (ref 1.1–2.2)
ALP SERPL-CCNC: 249 U/L (ref 40–129)
ALT SERPL-CCNC: 56 U/L (ref 10–40)
ANION GAP SERPL CALCULATED.3IONS-SCNC: 13 MMOL/L (ref 3–16)
ANISOCYTOSIS BLD QL SMEAR: ABNORMAL
AST SERPL-CCNC: 103 U/L (ref 15–37)
BASOPHILS # BLD: 0 K/UL (ref 0–0.2)
BASOPHILS NFR BLD: 0 %
BILIRUB SERPL-MCNC: 0.6 MG/DL (ref 0–1)
BUN SERPL-MCNC: 11 MG/DL (ref 7–20)
CALCIUM SERPL-MCNC: 8.6 MG/DL (ref 8.3–10.6)
CHLORIDE SERPL-SCNC: 95 MMOL/L (ref 99–110)
CO2 SERPL-SCNC: 25 MMOL/L (ref 21–32)
CREAT SERPL-MCNC: <0.5 MG/DL (ref 0.8–1.3)
DEPRECATED RDW RBC AUTO: 17 % (ref 12.4–15.4)
EOSINOPHIL # BLD: 0.2 K/UL (ref 0–0.6)
EOSINOPHIL NFR BLD: 1 %
GFR SERPLBLD CREATININE-BSD FMLA CKD-EPI: >90 ML/MIN/{1.73_M2}
GLUCOSE SERPL-MCNC: 91 MG/DL (ref 70–99)
HCT VFR BLD AUTO: 39.6 % (ref 40.5–52.5)
HGB BLD-MCNC: 12.8 G/DL (ref 13.5–17.5)
LYMPHOCYTES # BLD: 0.8 K/UL (ref 1–5.1)
LYMPHOCYTES NFR BLD: 4 %
MCH RBC QN AUTO: 28.5 PG (ref 26–34)
MCHC RBC AUTO-ENTMCNC: 32.2 G/DL (ref 31–36)
MCV RBC AUTO: 88.6 FL (ref 80–100)
MONOCYTES # BLD: 1 K/UL (ref 0–1.3)
MONOCYTES NFR BLD: 5 %
NEUTROPHILS # BLD: 17.9 K/UL (ref 1.7–7.7)
NEUTROPHILS NFR BLD: 90 %
NT-PROBNP SERPL-MCNC: 431 PG/ML (ref 0–124)
PLATELET # BLD AUTO: 449 K/UL (ref 135–450)
PLATELET BLD QL SMEAR: ADEQUATE
PMV BLD AUTO: 7.4 FL (ref 5–10.5)
POIKILOCYTOSIS BLD QL SMEAR: ABNORMAL
POTASSIUM SERPL-SCNC: 4.3 MMOL/L (ref 3.5–5.1)
PROT SERPL-MCNC: 5.4 G/DL (ref 6.4–8.2)
RBC # BLD AUTO: 4.47 M/UL (ref 4.2–5.9)
SLIDE REVIEW: ABNORMAL
SODIUM SERPL-SCNC: 133 MMOL/L (ref 136–145)
WBC # BLD AUTO: 19.9 K/UL (ref 4–11)

## 2024-05-29 PROCEDURE — 85025 COMPLETE CBC W/AUTO DIFF WBC: CPT

## 2024-05-29 PROCEDURE — 80053 COMPREHEN METABOLIC PANEL: CPT

## 2024-05-29 PROCEDURE — 83880 ASSAY OF NATRIURETIC PEPTIDE: CPT

## 2024-05-29 PROCEDURE — 36415 COLL VENOUS BLD VENIPUNCTURE: CPT

## 2024-05-30 DIAGNOSIS — R79.89 ELEVATED LIVER FUNCTION TESTS: Primary | ICD-10-CM

## 2024-06-01 DIAGNOSIS — J43.9 PULMONARY EMPHYSEMA, UNSPECIFIED EMPHYSEMA TYPE (HCC): ICD-10-CM

## 2024-06-01 RX ORDER — AZITHROMYCIN 250 MG/1
TABLET, FILM COATED ORAL
Qty: 6 TABLET | Refills: 0 | Status: SHIPPED | OUTPATIENT
Start: 2024-06-01 | End: 2024-06-11

## 2024-06-06 RX ORDER — ALBUTEROL SULFATE 90 UG/1
AEROSOL, METERED RESPIRATORY (INHALATION)
Qty: 8.5 G | Refills: 3 | Status: SHIPPED | OUTPATIENT
Start: 2024-06-06

## 2024-06-06 RX ORDER — AMLODIPINE BESYLATE 5 MG/1
5 TABLET ORAL DAILY
Qty: 90 TABLET | Refills: 1 | Status: SHIPPED | OUTPATIENT
Start: 2024-06-06

## 2024-06-06 NOTE — TELEPHONE ENCOUNTER
5/23/2024    Future Appointments   Date Time Provider Department Center   6/28/2024  3:15 PM Dorie Damon MD [facility]

## 2024-06-06 NOTE — TELEPHONE ENCOUNTER
3/29/2024        Future Appointments   Date Time Provider Department Center   6/28/2024  3:15 PM Dorie Damon MD [facility]

## 2024-06-10 ENCOUNTER — PATIENT MESSAGE (OUTPATIENT)
Dept: PULMONOLOGY | Age: 69
End: 2024-06-10

## 2024-06-11 ENCOUNTER — TELEPHONE (OUTPATIENT)
Dept: PULMONOLOGY | Age: 69
End: 2024-06-11

## 2024-06-11 NOTE — TELEPHONE ENCOUNTER
Sarah from [facility] called to report Pt's symptoms as well as to updated provider on bloodwork that came back from Pcp office of WBC count. Also is requesting a CXR order( per family) Pt Is scheduled for US Saturday and would like to do a CXR same day if possible        Do you have the following symptoms?  Shortness of Breath  no more than normal  Wheezing  no, but coarse rhonchi throghout( per home care nurse)  Cough  yes  Cough Characteristics:  Productive    yes  Sputum Color    light brown  Hemoptysis   no  Consistency of sputum   thick     Fever:    no    Temp:no  Chills/Sweats:  no  What other symptoms are you having?:  sore throat    How long have you had these symptoms?   Few weeks     Pharmacy: [facility] in [place]          Review medications and allergies:        Allergies?  prednisone        Currently on Antibiotics?  (Drug/Dose/Frequency and how long on?) no        Systemic Steroids?  (Drug/Dose/Frequency and how long on?) no          Last OV 4/9/24 with Dr. Damon   (pull in last visit note assessment/plan)        IMPRESSION:    1-COPD   2-cough  3-cavitary lesion   4-smoking                 PLAN:         Will proceed with following work up  Will need CT scan r/o nodule/cancer  Will get follow up PFT  Will get 6 m walk test  Will start Trelegy /   Will start course of Prednisone ,states he is allergic with rash   Azithromycin 10 days   Start Trelegy 200    -I spent 4-6 minutes counseling patient regarding smoking and the risk of Lung cancer and COPD and respiratory failure   [facility] was [facility] ,given instuction  Muiocnex   Flu and Pneumovax as primary

## 2024-06-11 NOTE — TELEPHONE ENCOUNTER
From: Lennox Dubois  To: Dr. Dorie Damon  Sent: 6/10/2024 2:01 PM EDT  Subject: Lennox Dubois Update     I (Frederic Nunezming, Perez’s son) was told to follow up with Dr. Barboza on chest congestion.     The following message was sent to his primary care provider 6/09/24:    Oxygen levels have been lower.   After short walk (about 45 ft) oxygen is 8o climbs up to 93 in 3-5 minutes.   Nights have been no higher than 92.   We have started him on Oxygen machine that produces up to 50% and his oxygen maintains 95-96 and he is able to sleep better at nights. He occasionally needs it during the day but usually he’s above 92 and feels OK.   We’re using a ALDAIR 5L/min Pulse Flow Portable Oxygen Concentrator NT-01    Chest Congestion: mucus is thick. Early this morning he said it was brownish. It has been yellow mukesh to clear after that. Appetite has been down in the evenings. The DOXYCYCLINE  mg twice a day coupled with the DEXAMETHASONE 2MG 3x a day has worked best.     Plan to get him to ultrasounds and future tests and appointments: A wheelchair is being delivered Wednesday the 12th. There are 2 steps into the garage. I’ve installed a handrail this weekend and the physical therapist are helping us on the safest way to get him down the steps.   We have a vehicle that we can easily/safely get him in and out from.     If Ray cannot physically do Ct scan should we have chest x-ray performed?   -Frederic Dubois

## 2024-06-12 ENCOUNTER — PATIENT MESSAGE (OUTPATIENT)
Dept: FAMILY MEDICINE CLINIC | Age: 69
End: 2024-06-12

## 2024-06-12 NOTE — TELEPHONE ENCOUNTER
From: Lennox Dubois  To: Ellie Green  Sent: 6/12/2024 2:01 PM EDT  Subject: Pre Admission Hospital Referral     Can you provide a referral for Lennox Dubois for [facility]?  Thank you  -Frederic Dubois

## 2024-06-20 NOTE — TELEPHONE ENCOUNTER
Called pt, spoke to pt son, Frederic, pt is still IP at [facility]. He will call us when he is released for follow up

## 2024-06-26 RX ORDER — FUROSEMIDE 20 MG/1
20 TABLET ORAL DAILY
Qty: 30 TABLET | Refills: 0 | OUTPATIENT
Start: 2024-06-26

## 2024-08-13 RX ORDER — ATORVASTATIN CALCIUM 40 MG/1
40 TABLET, FILM COATED ORAL DAILY
Qty: 90 TABLET | Refills: 3 | OUTPATIENT
Start: 2024-08-13